# Patient Record
Sex: FEMALE | Race: WHITE | NOT HISPANIC OR LATINO | Employment: FULL TIME | URBAN - METROPOLITAN AREA
[De-identification: names, ages, dates, MRNs, and addresses within clinical notes are randomized per-mention and may not be internally consistent; named-entity substitution may affect disease eponyms.]

---

## 2022-11-28 ENCOUNTER — PATIENT MESSAGE (OUTPATIENT)
Dept: CARDIOLOGY | Facility: CLINIC | Age: 47
End: 2022-11-28

## 2023-01-19 ENCOUNTER — OFFICE VISIT (OUTPATIENT)
Dept: URGENT CARE | Facility: CLINIC | Age: 48
End: 2023-01-19
Payer: COMMERCIAL

## 2023-01-19 VITALS
DIASTOLIC BLOOD PRESSURE: 81 MMHG | HEIGHT: 63 IN | TEMPERATURE: 99 F | RESPIRATION RATE: 20 BRPM | OXYGEN SATURATION: 97 % | WEIGHT: 178 LBS | HEART RATE: 74 BPM | SYSTOLIC BLOOD PRESSURE: 115 MMHG | BODY MASS INDEX: 31.54 KG/M2

## 2023-01-19 DIAGNOSIS — H66.91 RIGHT OTITIS MEDIA, UNSPECIFIED OTITIS MEDIA TYPE: Primary | ICD-10-CM

## 2023-01-19 PROBLEM — T81.328A DEHISCENCE OF VAGINAL CUFF: Status: ACTIVE | Noted: 2023-01-19

## 2023-01-19 PROBLEM — I10 ESSENTIAL HYPERTENSION: Status: ACTIVE | Noted: 2020-10-14

## 2023-01-19 PROBLEM — E07.9 THYROID DISEASE: Status: ACTIVE | Noted: 2023-01-19

## 2023-01-19 PROBLEM — E03.9 ACQUIRED HYPOTHYROIDISM: Status: ACTIVE | Noted: 2021-08-07

## 2023-01-19 PROBLEM — G43.909 MIGRAINE SYNDROME: Status: ACTIVE | Noted: 2023-01-19

## 2023-01-19 PROBLEM — E66.811 CLASS 1 OBESITY DUE TO EXCESS CALORIES WITH SERIOUS COMORBIDITY AND BODY MASS INDEX (BMI) OF 31.0 TO 31.9 IN ADULT: Status: ACTIVE | Noted: 2020-03-19

## 2023-01-19 PROBLEM — K62.5 RECTAL BLEEDING: Status: ACTIVE | Noted: 2021-08-07

## 2023-01-19 PROBLEM — R74.8 ELEVATED LIVER ENZYMES: Status: ACTIVE | Noted: 2021-08-08

## 2023-01-19 PROBLEM — T81.31XA DEHISCENCE OF VAGINAL CUFF: Status: ACTIVE | Noted: 2023-01-19

## 2023-01-19 PROBLEM — T78.02XA ANAPHYLAXIS DUE TO SHELLFISH: Status: ACTIVE | Noted: 2020-10-14

## 2023-01-19 PROBLEM — F41.9 ANXIETY: Status: ACTIVE | Noted: 2023-01-19

## 2023-01-19 PROBLEM — E66.09 CLASS 1 OBESITY DUE TO EXCESS CALORIES WITH SERIOUS COMORBIDITY AND BODY MASS INDEX (BMI) OF 31.0 TO 31.9 IN ADULT: Status: ACTIVE | Noted: 2020-03-19

## 2023-01-19 LAB
CTP QC/QA: YES
CTP QC/QA: YES
MOLECULAR STREP A: NEGATIVE
SARS-COV-2 AG RESP QL IA.RAPID: NEGATIVE

## 2023-01-19 PROCEDURE — 3074F PR MOST RECENT SYSTOLIC BLOOD PRESSURE < 130 MM HG: ICD-10-PCS | Mod: CPTII,S$GLB,, | Performed by: PHYSICIAN ASSISTANT

## 2023-01-19 PROCEDURE — 3079F DIAST BP 80-89 MM HG: CPT | Mod: CPTII,S$GLB,, | Performed by: PHYSICIAN ASSISTANT

## 2023-01-19 PROCEDURE — 3008F BODY MASS INDEX DOCD: CPT | Mod: CPTII,S$GLB,, | Performed by: PHYSICIAN ASSISTANT

## 2023-01-19 PROCEDURE — 87651 POCT STREP A MOLECULAR: ICD-10-PCS | Mod: QW,S$GLB,, | Performed by: PHYSICIAN ASSISTANT

## 2023-01-19 PROCEDURE — 1159F MED LIST DOCD IN RCRD: CPT | Mod: CPTII,S$GLB,, | Performed by: PHYSICIAN ASSISTANT

## 2023-01-19 PROCEDURE — 87811 SARS CORONAVIRUS 2 ANTIGEN POCT, MANUAL READ: ICD-10-PCS | Mod: QW,S$GLB,, | Performed by: PHYSICIAN ASSISTANT

## 2023-01-19 PROCEDURE — 87811 SARS-COV-2 COVID19 W/OPTIC: CPT | Mod: QW,S$GLB,, | Performed by: PHYSICIAN ASSISTANT

## 2023-01-19 PROCEDURE — 87651 STREP A DNA AMP PROBE: CPT | Mod: QW,S$GLB,, | Performed by: PHYSICIAN ASSISTANT

## 2023-01-19 PROCEDURE — 3074F SYST BP LT 130 MM HG: CPT | Mod: CPTII,S$GLB,, | Performed by: PHYSICIAN ASSISTANT

## 2023-01-19 PROCEDURE — 1159F PR MEDICATION LIST DOCUMENTED IN MEDICAL RECORD: ICD-10-PCS | Mod: CPTII,S$GLB,, | Performed by: PHYSICIAN ASSISTANT

## 2023-01-19 PROCEDURE — 1160F RVW MEDS BY RX/DR IN RCRD: CPT | Mod: CPTII,S$GLB,, | Performed by: PHYSICIAN ASSISTANT

## 2023-01-19 PROCEDURE — 99203 PR OFFICE/OUTPT VISIT, NEW, LEVL III, 30-44 MIN: ICD-10-PCS | Mod: S$GLB,,, | Performed by: PHYSICIAN ASSISTANT

## 2023-01-19 PROCEDURE — 1160F PR REVIEW ALL MEDS BY PRESCRIBER/CLIN PHARMACIST DOCUMENTED: ICD-10-PCS | Mod: CPTII,S$GLB,, | Performed by: PHYSICIAN ASSISTANT

## 2023-01-19 PROCEDURE — 99203 OFFICE O/P NEW LOW 30 MIN: CPT | Mod: S$GLB,,, | Performed by: PHYSICIAN ASSISTANT

## 2023-01-19 PROCEDURE — 3008F PR BODY MASS INDEX (BMI) DOCUMENTED: ICD-10-PCS | Mod: CPTII,S$GLB,, | Performed by: PHYSICIAN ASSISTANT

## 2023-01-19 PROCEDURE — 3079F PR MOST RECENT DIASTOLIC BLOOD PRESSURE 80-89 MM HG: ICD-10-PCS | Mod: CPTII,S$GLB,, | Performed by: PHYSICIAN ASSISTANT

## 2023-01-19 RX ORDER — CEFDINIR 300 MG/1
300 CAPSULE ORAL 2 TIMES DAILY
Qty: 20 CAPSULE | Refills: 0 | Status: SHIPPED | OUTPATIENT
Start: 2023-01-19 | End: 2023-01-29

## 2023-01-19 RX ORDER — TRAMADOL HYDROCHLORIDE 50 MG/1
50 TABLET ORAL EVERY 6 HOURS PRN
COMMUNITY
Start: 2022-11-23

## 2023-01-19 RX ORDER — DICYCLOMINE HYDROCHLORIDE 20 MG/1
TABLET ORAL
COMMUNITY
Start: 2022-12-10

## 2023-01-19 RX ORDER — CYCLOBENZAPRINE HCL 10 MG
10 TABLET ORAL
COMMUNITY
Start: 2022-11-28

## 2023-01-19 RX ORDER — DEXTROAMPHETAMINE SACCHARATE, AMPHETAMINE ASPARTATE MONOHYDRATE, DEXTROAMPHETAMINE SULFATE AND AMPHETAMINE SULFATE 3.75; 3.75; 3.75; 3.75 MG/1; MG/1; MG/1; MG/1
CAPSULE, EXTENDED RELEASE ORAL
COMMUNITY
Start: 2022-10-27

## 2023-01-19 RX ORDER — CYANOCOBALAMIN 1000 UG/ML
INJECTION, SOLUTION INTRAMUSCULAR; SUBCUTANEOUS
COMMUNITY
Start: 2022-10-28

## 2023-01-19 RX ORDER — HYDROXYZINE HYDROCHLORIDE 25 MG/1
25 TABLET, FILM COATED ORAL
COMMUNITY
Start: 2022-12-28

## 2023-01-19 RX ORDER — PANTOPRAZOLE SODIUM 20 MG/1
20 TABLET, DELAYED RELEASE ORAL EVERY MORNING
COMMUNITY
Start: 2022-12-10

## 2023-01-19 NOTE — PATIENT INSTRUCTIONS

## 2023-01-19 NOTE — PROGRESS NOTES
"Subjective:       Patient ID: Tiffany Kamara is a 47 y.o. female.    Vitals:  height is 5' 3" (1.6 m) and weight is 80.7 kg (178 lb). Her oral temperature is 99.2 °F (37.3 °C). Her blood pressure is 115/81 and her pulse is 74. Her respiration is 20 and oxygen saturation is 97%.     Chief Complaint: Sore Throat    Patient is a 47 year old female who presents with a 3 day history of throat pain and ear pain. Patient states she has some associated swollen/painful lymph nodes. She states she has taken tylenol with some relief. No cough, congestion, fever, chills, CP, SOB, nausea, vomiting or diarrhea. No known sick contacts.     Sore Throat   This is a new problem. The current episode started in the past 7 days (Tuesday). The problem has been gradually worsening. The pain is worse on the right side. There has been no fever. The pain is at a severity of 7/10. The pain is moderate. Associated symptoms include congestion (ear), ear pain (right) and swollen glands. Pertinent negatives include no abdominal pain, coughing, diarrhea, drooling, ear discharge, headaches, hoarse voice, plugged ear sensation, neck pain, shortness of breath, stridor, trouble swallowing or vomiting. She has had no exposure to strep or mono. She has tried acetaminophen (Tylenol) for the symptoms. The treatment provided no relief.     Constitution: Negative for chills and fever.   HENT:  Positive for ear pain (right), congestion (ear) and sore throat. Negative for ear discharge, drooling, postnasal drip, sinus pain, sinus pressure and trouble swallowing.    Neck: Negative for neck pain and painful lymph nodes.   Cardiovascular:  Negative for chest pain.   Respiratory:  Negative for cough, sputum production, shortness of breath, stridor and wheezing.    Gastrointestinal:  Negative for abdominal pain, nausea, vomiting and diarrhea.   Musculoskeletal:  Negative for muscle ache.   Neurological:  Negative for headaches.   Hematologic/Lymphatic: Negative for " swollen lymph nodes.     Objective:      Physical Exam   Constitutional: She is oriented to person, place, and time. She appears well-developed. She is cooperative.  Non-toxic appearance. She does not appear ill. No distress.   HENT:   Head: Normocephalic and atraumatic.   Ears:   Right Ear: Hearing, external ear and ear canal normal. No mastoid tenderness. Tympanic membrane is injected, erythematous and bulging. No middle ear effusion.   Left Ear: Hearing, tympanic membrane, external ear and ear canal normal. No mastoid tenderness. Tympanic membrane is not injected, not erythematous and not bulging.  No middle ear effusion.   Nose: Mucosal edema present. No rhinorrhea or nasal deformity. No epistaxis. Right sinus exhibits no maxillary sinus tenderness and no frontal sinus tenderness. Left sinus exhibits no maxillary sinus tenderness and no frontal sinus tenderness.   Mouth/Throat: Uvula is midline and mucous membranes are normal. No trismus in the jaw. Normal dentition. No uvula swelling. Posterior oropharyngeal erythema present. No oropharyngeal exudate or posterior oropharyngeal edema.   Eyes: Conjunctivae and lids are normal. No scleral icterus.   Neck: Trachea normal and phonation normal. Neck supple. No edema present. No erythema present. No neck rigidity present.   Cardiovascular: Normal rate, regular rhythm, normal heart sounds and normal pulses.   Pulmonary/Chest: Effort normal and breath sounds normal. No respiratory distress. She has no decreased breath sounds. She has no rhonchi.   Abdominal: Normal appearance.   Musculoskeletal: Normal range of motion.         General: No deformity. Normal range of motion.   Lymphadenopathy:     She has cervical adenopathy.        Right cervical: Superficial cervical adenopathy present.        Left cervical: No superficial cervical adenopathy present.   Neurological: She is alert and oriented to person, place, and time. She exhibits normal muscle tone. Coordination  normal.   Skin: Skin is warm, dry, intact, not diaphoretic and not pale.   Psychiatric: Her speech is normal and behavior is normal. Judgment and thought content normal.   Nursing note and vitals reviewed.      Results for orders placed or performed in visit on 01/19/23   POCT Strep A, Molecular   Result Value Ref Range    Molecular Strep A, POC Negative Negative     Acceptable Yes    SARS Coronavirus 2 Antigen, POCT Manual Read   Result Value Ref Range    SARS Coronavirus 2 Antigen Negative Negative     Acceptable Yes        Assessment:       1. Right otitis media, unspecified otitis media type          Plan:         Right otitis media, unspecified otitis media type  -     POCT Strep A, Molecular  -     SARS Coronavirus 2 Antigen, POCT Manual Read  -     cefdinir (OMNICEF) 300 MG capsule; Take 1 capsule (300 mg total) by mouth 2 (two) times daily. for 10 days  Dispense: 20 capsule; Refill: 0    Discussed results with patient.  Discussed use of OTC medications for symptom control as well as oral abx for ear infection.   All ER precautions covered including but not limited to shortness of breath, intractable fever, severe ear pain, ear drainage or chest pain.  Discussed RTC if symptoms worsen, change, or persist.     Patient verbalized understanding and agreed with the plan.     Halle Vidales PA-C    Patient Instructions   General Discharge Instructions   If you were prescribed a narcotic or controlled medication, do not drive or operate heavy equipment or machinery while taking these medications.  If you were prescribed antibiotics, please take them to completion.  You must understand that you've received an Urgent Care treatment only and that you may be released before all your medical problems are known or treated. You, the patient, will arrange for follow up care as instructed.  Follow up with your PCP or specialty clinic as directed in the next 1-2 weeks if not improved or as  needed.  You can call [phone] to schedule an appointment with the appropriate provider.  If your condition worsens we recommend that you receive another evaluation at the emergency room immediately or contact your primary medical clinics after hours call service to discuss your concerns.  Please return here or go to the Emergency Department for any concerns or worsening of condition.

## 2023-01-22 ENCOUNTER — TELEPHONE (OUTPATIENT)
Dept: URGENT CARE | Facility: CLINIC | Age: 48
End: 2023-01-22
Payer: COMMERCIAL

## 2023-01-22 NOTE — TELEPHONE ENCOUNTER
Patient went to a different provider and 2 hours late she tasted positive for strep and was prescribed abx. Patient stated she is feeling better.

## 2023-04-24 PROBLEM — K62.5 RECTAL BLEEDING: Status: RESOLVED | Noted: 2021-08-07 | Resolved: 2023-04-24

## 2025-07-10 ENCOUNTER — HOSPITAL ENCOUNTER (EMERGENCY)
Facility: HOSPITAL | Age: 50
Discharge: HOME | End: 2025-07-10
Attending: EMERGENCY MEDICINE
Payer: COMMERCIAL

## 2025-07-10 VITALS
OXYGEN SATURATION: 97 % | TEMPERATURE: 98 F | RESPIRATION RATE: 18 BRPM | DIASTOLIC BLOOD PRESSURE: 90 MMHG | HEIGHT: 63 IN | BODY MASS INDEX: 28.53 KG/M2 | HEART RATE: 75 BPM | SYSTOLIC BLOOD PRESSURE: 143 MMHG | WEIGHT: 161 LBS

## 2025-07-10 DIAGNOSIS — R31.0 GROSS HEMATURIA: Primary | ICD-10-CM

## 2025-07-10 LAB
ABSOLUTE EOSINOPHIL (OHS): 0.21 K/UL
ABSOLUTE MONOCYTE (OHS): 0.75 K/UL (ref 0.3–1)
ABSOLUTE NEUTROPHIL COUNT (OHS): 9.45 K/UL (ref 1.8–7.7)
ALBUMIN SERPL BCP-MCNC: 4.1 G/DL (ref 3.5–5.2)
ALP SERPL-CCNC: 138 UNIT/L (ref 40–150)
ALT SERPL W/O P-5'-P-CCNC: 52 UNIT/L (ref 10–44)
ANION GAP (OHS): 13 MMOL/L (ref 8–16)
AST SERPL-CCNC: 71 UNIT/L (ref 11–45)
BACTERIA #/AREA URNS HPF: ABNORMAL /HPF
BASOPHILS # BLD AUTO: 0.06 K/UL
BASOPHILS NFR BLD AUTO: 0.4 %
BILIRUB SERPL-MCNC: 0.3 MG/DL (ref 0.1–1)
BILIRUB UR QL STRIP.AUTO: ABNORMAL
BUN SERPL-MCNC: 6 MG/DL (ref 6–20)
CALCIUM SERPL-MCNC: 8.9 MG/DL (ref 8.7–10.5)
CHLORIDE SERPL-SCNC: 101 MMOL/L (ref 95–110)
CLARITY UR: CLEAR
CO2 SERPL-SCNC: 22 MMOL/L (ref 23–29)
COLOR UR AUTO: ABNORMAL
CREAT SERPL-MCNC: 0.8 MG/DL (ref 0.5–1.4)
ERYTHROCYTE [DISTWIDTH] IN BLOOD BY AUTOMATED COUNT: 11.6 % (ref 11.5–14.5)
GFR SERPLBLD CREATININE-BSD FMLA CKD-EPI: >60 ML/MIN/1.73/M2
GLUCOSE SERPL-MCNC: 99 MG/DL (ref 70–110)
GLUCOSE UR QL STRIP: ABNORMAL
HCT VFR BLD AUTO: 43 % (ref 37–48.5)
HGB BLD-MCNC: 15.5 GM/DL (ref 12–16)
HGB UR QL STRIP: ABNORMAL
HOLD SPECIMEN: NORMAL
HYALINE CASTS #/AREA URNS LPF: 2 /LPF (ref 0–1)
IMM GRANULOCYTES # BLD AUTO: 0.05 K/UL (ref 0–0.04)
IMM GRANULOCYTES NFR BLD AUTO: 0.4 % (ref 0–0.5)
KETONES UR QL STRIP: ABNORMAL
LEUKOCYTE ESTERASE UR QL STRIP: ABNORMAL
LYMPHOCYTES # BLD AUTO: 2.96 K/UL (ref 1–4.8)
MCH RBC QN AUTO: 34.3 PG (ref 27–31)
MCHC RBC AUTO-ENTMCNC: 36 G/DL (ref 32–36)
MCV RBC AUTO: 95 FL (ref 82–98)
MICROSCOPIC COMMENT: ABNORMAL
NITRITE UR QL STRIP: ABNORMAL
NUCLEATED RBC (/100WBC) (OHS): 0 /100 WBC
PH UR STRIP: ABNORMAL [PH]
PLATELET # BLD AUTO: 335 K/UL (ref 150–450)
PMV BLD AUTO: 8.8 FL (ref 9.2–12.9)
POTASSIUM SERPL-SCNC: 3.4 MMOL/L (ref 3.5–5.1)
PROT SERPL-MCNC: 7.3 GM/DL (ref 6–8.4)
PROT UR QL STRIP: ABNORMAL
RBC # BLD AUTO: 4.52 M/UL (ref 4–5.4)
RBC #/AREA URNS HPF: 25 /HPF (ref 0–4)
RELATIVE EOSINOPHIL (OHS): 1.6 %
RELATIVE LYMPHOCYTE (OHS): 22 % (ref 18–48)
RELATIVE MONOCYTE (OHS): 5.6 % (ref 4–15)
RELATIVE NEUTROPHIL (OHS): 70 % (ref 38–73)
SODIUM SERPL-SCNC: 136 MMOL/L (ref 136–145)
SP GR UR STRIP: ABNORMAL
SQUAMOUS #/AREA URNS HPF: 3 /HPF
UROBILINOGEN UR STRIP-ACNC: ABNORMAL EU/DL
WBC # BLD AUTO: 13.48 K/UL (ref 3.9–12.7)
WBC #/AREA URNS HPF: 40 /HPF (ref 0–5)

## 2025-07-10 PROCEDURE — 96375 TX/PRO/DX INJ NEW DRUG ADDON: CPT

## 2025-07-10 PROCEDURE — 74176 CT ABD & PELVIS W/O CONTRAST: CPT | Mod: TC,FY

## 2025-07-10 PROCEDURE — 96374 THER/PROPH/DIAG INJ IV PUSH: CPT

## 2025-07-10 PROCEDURE — 25000003 PHARM REV CODE 250: Performed by: EMERGENCY MEDICINE

## 2025-07-10 PROCEDURE — 87086 URINE CULTURE/COLONY COUNT: CPT | Performed by: EMERGENCY MEDICINE

## 2025-07-10 PROCEDURE — 80053 COMPREHEN METABOLIC PANEL: CPT | Performed by: EMERGENCY MEDICINE

## 2025-07-10 PROCEDURE — 85025 COMPLETE CBC W/AUTO DIFF WBC: CPT | Performed by: EMERGENCY MEDICINE

## 2025-07-10 PROCEDURE — 99285 EMERGENCY DEPT VISIT HI MDM: CPT | Mod: 25

## 2025-07-10 PROCEDURE — 63600175 PHARM REV CODE 636 W HCPCS: Performed by: EMERGENCY MEDICINE

## 2025-07-10 PROCEDURE — 81003 URINALYSIS AUTO W/O SCOPE: CPT | Performed by: EMERGENCY MEDICINE

## 2025-07-10 RX ORDER — HYDROCODONE BITARTRATE AND ACETAMINOPHEN 10; 325 MG/1; MG/1
1 TABLET ORAL
Refills: 0 | Status: COMPLETED | OUTPATIENT
Start: 2025-07-10 | End: 2025-07-10

## 2025-07-10 RX ORDER — KETOROLAC TROMETHAMINE 30 MG/ML
30 INJECTION, SOLUTION INTRAMUSCULAR; INTRAVENOUS
Status: COMPLETED | OUTPATIENT
Start: 2025-07-10 | End: 2025-07-10

## 2025-07-10 RX ORDER — ONDANSETRON HYDROCHLORIDE 2 MG/ML
4 INJECTION, SOLUTION INTRAVENOUS
Status: COMPLETED | OUTPATIENT
Start: 2025-07-10 | End: 2025-07-10

## 2025-07-10 RX ORDER — ACETAMINOPHEN 325 MG/1
650 TABLET ORAL
Status: COMPLETED | OUTPATIENT
Start: 2025-07-10 | End: 2025-07-10

## 2025-07-10 RX ORDER — ONDANSETRON 4 MG/1
4 TABLET, ORALLY DISINTEGRATING ORAL EVERY 6 HOURS PRN
Qty: 12 TABLET | Refills: 0 | Status: SHIPPED | OUTPATIENT
Start: 2025-07-10

## 2025-07-10 RX ORDER — CEPHALEXIN 250 MG/1
250 CAPSULE ORAL EVERY 8 HOURS
Qty: 21 CAPSULE | Refills: 0 | Status: ON HOLD | OUTPATIENT
Start: 2025-07-10 | End: 2025-07-19 | Stop reason: HOSPADM

## 2025-07-10 RX ORDER — HYDROCODONE BITARTRATE AND ACETAMINOPHEN 10; 325 MG/1; MG/1
1 TABLET ORAL EVERY 6 HOURS PRN
Qty: 12 TABLET | Refills: 0 | Status: ON HOLD | OUTPATIENT
Start: 2025-07-10 | End: 2025-07-19 | Stop reason: HOSPADM

## 2025-07-10 RX ADMIN — KETOROLAC TROMETHAMINE 30 MG: 30 INJECTION, SOLUTION INTRAMUSCULAR; INTRAVENOUS at 02:07

## 2025-07-10 RX ADMIN — ONDANSETRON 4 MG: 2 INJECTION INTRAMUSCULAR; INTRAVENOUS at 02:07

## 2025-07-10 RX ADMIN — ACETAMINOPHEN 650 MG: 325 TABLET ORAL at 02:07

## 2025-07-10 RX ADMIN — HYDROCODONE BITARTRATE AND ACETAMINOPHEN 1 TABLET: 10; 325 TABLET ORAL at 03:07

## 2025-07-10 NOTE — ED PROVIDER NOTES
Encounter Date: 7/10/2025       History     Chief Complaint   Patient presents with    Dysuria     Pt reports pain and burning with urination. Pt reports urinary frequency. Pt reports symptoms began a few hours ago.     Hematuria     Pt reports blood clots in urine.     Abdominal Pain     Pt reports lower abd/pelvic pain in the area of the bladder. Pt reports all symptoms began a few hours ago. Pt reports she knew it was a UTI and was trying to drink lots of water but nothing was helping the symptoms. Pt reports the pain is making her feel dizzy and making her ears ring.      49-year-old female here from home via private vehicle for evaluation and treatment of left lower quadrant abdominal pain.  Pain started a few days ago in left mid back.  She does endorse hematuria, and admits to history of kidney stones.  She has not tried any medications at home for her symptoms.  Nothing she does makes her symptoms any better or worse.  Also complains of mild nausea.  No vomiting.  Has had occasional burning with urination.  Symptoms became significantly worse few hours ago.      Review of patient's allergies indicates:   Allergen Reactions    Codeine Hives     Other reaction(s): CHEST PAINS    Hydromorphone      Other reaction(s): CHEST PAINS    Penicillins Palpitations and Rash     Other reaction(s): SWELLING OF FACE AND ARMS  Rash as child, has tollerated keflex as adult      Shellfish containing products Anaphylaxis    Tigan [trimethobenzamide] Swelling    Sumatriptan succinate Other (See Comments)     Other reaction(s): Other (See Comments)  Patient can't remember what happens when she takes it  parasthias       Morphine     Stadol [butorphanol]     Hydrocodone Palpitations     Past Medical History:   Diagnosis Date    Hypertension     Hypothyroidism     Urinary tract infection, site not specified      Past Surgical History:   Procedure Laterality Date    APPENDECTOMY      CHOLECYSTECTOMY      DILATION AND CURETTAGE OF  UTERUS      HYSTERECTOMY  12/17/2017    [facility]/[place]    KNEE ARTHRODESIS      LAPAROSCOPY      Pubovaginal Sling with Lynx and Cystoscopy      REVISION OF VAGINAL CUFF      TONSILLECTOMY       Family History   Problem Relation Name Age of Onset    Breast cancer Mother       Social History[1]  Review of Systems   Constitutional:  Negative for chills and fatigue.   HENT:  Negative for congestion, rhinorrhea and sore throat.    Eyes:  Negative for photophobia and pain.   Respiratory:  Negative for cough, chest tightness and shortness of breath.    Cardiovascular:  Negative for chest pain and palpitations.   Gastrointestinal:  Positive for abdominal pain and nausea. Negative for blood in stool, constipation, diarrhea and vomiting.   Genitourinary:  Positive for dysuria, flank pain, frequency and hematuria. Negative for decreased urine volume.   Musculoskeletal:  Positive for back pain (Left, middle). Negative for myalgias and neck pain.   Neurological:  Negative for dizziness, syncope, weakness, light-headedness, numbness and headaches.   Psychiatric/Behavioral:  The patient is not nervous/anxious.        Physical Exam     Initial Vitals [07/10/25 0202]   BP Pulse Resp Temp SpO2   (!) 149/98 92 17 97.8 °F (36.6 °C) 99 %      MAP       --         Physical Exam    Nursing note and vitals reviewed.  Constitutional: She appears well-developed and well-nourished. She is not diaphoretic. She appears distressed (Secondary to pain).   HENT:   Head: Normocephalic and atraumatic.   Nose: Nose normal. Mouth/Throat: Oropharynx is clear and moist. No oropharyngeal exudate.   Eyes: Conjunctivae and EOM are normal. Pupils are equal, round, and reactive to light. No scleral icterus.   Neck: Neck supple. No JVD present.   Normal range of motion.  Cardiovascular:  Normal rate, regular rhythm, normal heart sounds and intact distal pulses.           No murmur heard.  Pulmonary/Chest: Breath sounds normal. No stridor. No respiratory distress.  She has no wheezes. She has no rhonchi. She has no rales.   Abdominal: Abdomen is soft. Bowel sounds are normal. She exhibits no distension. There is abdominal tenderness (Left lower quadrant). There is no rebound and no guarding.   Musculoskeletal:         General: No tenderness or edema. Normal range of motion.      Cervical back: Normal range of motion and neck supple.     Neurological: She is alert and oriented to person, place, and time. She has normal strength. No cranial nerve deficit or sensory deficit. GCS score is 15. GCS eye subscore is 4. GCS verbal subscore is 5. GCS motor subscore is 6.   Skin: Skin is warm and dry. Capillary refill takes less than 2 seconds. No rash noted. No erythema.   Psychiatric: She has a normal mood and affect. Her behavior is normal.         ED Course   Procedures  Labs Reviewed   URINALYSIS, REFLEX TO URINE CULTURE - Abnormal       Result Value    Color, UA Orange (*)     Appearance, UA Clear      pH, UA Color may interfere with results      Spec Grav UA Color may interfere with results      Protein, UA Color may interfere with results      Glucose, UA Color may interfere with results      Ketones, UA Color may interfere with results      Bilirubin, UA Color may interfere with results      Blood, UA Color may interfere with results      Nitrites, UA Color may interfere with results      Urobilinogen, UA Color may interfere with results      Leukocyte Esterase, UA Color may interfere with results     URINALYSIS MICROSCOPIC - Abnormal    RBC, UA 25 (*)     WBC, UA 40 (*)     Bacteria, UA Occasional      Squamous Epithelial Cells, UA 3      Hyaline Casts, UA 2 (*)     Microscopic Comment       COMPREHENSIVE METABOLIC PANEL - Abnormal    Sodium 136      Potassium 3.4 (*)     Chloride 101      CO2 22 (*)     Glucose 99      BUN 6      Creatinine 0.8      Calcium 8.9      Protein Total 7.3      Albumin 4.1      Bilirubin Total 0.3            AST 71 (*)     ALT 52 (*)      Anion Gap 13      eGFR >60     CBC WITH DIFFERENTIAL - Abnormal    WBC 13.48 (*)     RBC 4.52      HGB 15.5      HCT 43.0      MCV 95      MCH 34.3 (*)     MCHC 36.0      RDW 11.6      Platelet Count 335      MPV 8.8 (*)     Nucleated RBC 0      Neut % 70.0      Lymph % 22.0      Mono % 5.6      Eos % 1.6      Basophil % 0.4      Imm Grans % 0.4      Neut # 9.45 (*)     Lymph # 2.96      Mono # 0.75      Eos # 0.21      Baso # 0.06      Imm Grans # 0.05 (*)    CULTURE, URINE   CBC W/ AUTO DIFFERENTIAL    Narrative:     The following orders were created for panel order CBC auto differential.  Procedure                               Abnormality         Status                     ---------                               -----------         ------                     CBC with Differential[[id number]]       Abnormal            Final result                 Please view results for these tests on the individual orders.   EXTRA TUBES    Narrative:     The following orders were created for panel order EXTRA TUBES.  Procedure                               Abnormality         Status                     ---------                               -----------         ------                     Gold Top Hold[[id number]]                                   Final result                 Please view results for these tests on the individual orders.   GOLD TOP HOLD    Extra Tube Hold for add-ons.     GREY TOP URINE HOLD          Imaging Results              CT Renal Stone Study ABD Pelvis WO (Final result)  Result time 07/10/25 05:02:57      Final result by Kirby Glaser MD (07/10/25 05:02:57)                   Impression:      No calculi or hydroureteronephrosis.    Contracted bladder with thickened wall.  Correlate with urinalysis if clinical concern for cystitis.    Prior cholecystectomy and hysterectomy.      Electronically signed by: Kirby Glaser MD  Date:    07/10/2025  Time:    05:02               Narrative:     EXAMINATION:  CT RENAL STONE STUDY ABD PELVIS WO    CLINICAL HISTORY:  Flank pain, kidney stone suspected;    TECHNIQUE:  Low dose axial images, sagittal and coronal reformations were obtained from the lung bases to the pubic symphysis.  Contrast was not administered.    COMPARISON:  None    FINDINGS:  Heart: Normal in size. No pericardial effusion.    Lung Bases: Well aerated, without consolidation or pleural fluid.    Liver: Normal in size and attenuation, with no focal hepatic lesions.    Gallbladder: Surgically absent.    Bile Ducts: No evidence of dilated ducts.    Pancreas: No mass or peripancreatic fat stranding.    Spleen: Unremarkable.    Adrenals: Unremarkable.    Kidneys/ Ureters: No calculi or hydronephrosis.    Bladder: Contracted with thickened wall.    Reproductive organs: Uterus is absent.    GI Tract/Mesentery: No evidence of bowel obstruction or inflammation.    Peritoneal Space: No ascites. No free air.    Retroperitoneum: No significant adenopathy.    Abdominal wall: Unremarkable.    Vasculature: No significant atherosclerosis or aneurysm.    Bones: Limbus vertebra superior anterior margin of L4.                                    X-Rays:   Independently Interpreted Readings:   Other Readings:  CT abdomen pelvis personally reviewed by me showed no evidence of hydronephrosis or hydroureter, no ureteral calculi were noted.  Bladder walls thickened, consistent with cystitis    Medications   ketorolac injection 30 mg (30 mg Intravenous Given 7/10/25 0255)   ondansetron injection 4 mg (4 mg Intravenous Given 7/10/25 0255)   acetaminophen tablet 650 mg (650 mg Oral Given 7/10/25 0255)   HYDROcodone-acetaminophen  mg per tablet 1 tablet (1 tablet Oral Given 7/10/25 0347)     Medical Decision Making  Differential includes pyelonephritis, ureteral calculus, cystitis,, appendicitis, neoplasm, etc.    Labs showed a mildly elevated white blood cell count.  Urinalysis did not show a significant amount  of bacteria however.  CT was unremarkable, with a normal-appearing kidney and ureter, and no evidence of ureteral calculus.  Bladder walls were thickened, indicating possible cystitis.  Patient is feeling much better after being given hydrocodone here.  She also received Toradol.  I believe she is safe for discharge home.  Will start Keflex as a precaution case there is an infection, and she will be given hydrocodone and Zofran.  She will follow-up with her urologist within the next few days and return here as needed or if worse in any way.    Amount and/or Complexity of Data Reviewed  Labs: ordered.  Radiology: ordered.    Risk  OTC drugs.  Prescription drug management.                                      Clinical Impression:  Final diagnoses:  [R31.0] Gross hematuria (Primary)          ED Disposition Condition    Discharge Stable          ED Prescriptions       Medication Sig Dispense Start Date End Date Auth. Provider    HYDROcodone-acetaminophen (NORCO)  mg per tablet Take 1 tablet by mouth every 6 (six) hours as needed for Pain. 12 tablet 7/10/2025 -- Fady Thomas MD    ondansetron (ZOFRAN-ODT) 4 MG TbDL Take 1 tablet (4 mg total) by mouth every 6 (six) hours as needed. 12 tablet 7/10/2025 -- Fady Thomas MD    cephALEXin (KEFLEX) 250 MG capsule Take 1 capsule (250 mg total) by mouth every 8 (eight) hours. for 7 days 21 capsule 7/10/2025 7/17/2025 Fady Thomas MD          Follow-up Information       Follow up With Specialties Details Why Contact Info    Your urologist  Call today      [facility] Emergency Dept Emergency Medicine  As needed, If symptoms worsen [address] [place] [address] [phone]                 Fady Thomas MD  07/10/25 0514       Fady Thomas MD  07/10/25 0517         [1]   Social History  Tobacco Use    Smoking status: Never     Passive exposure: Never    Smokeless tobacco: Never   Vaping Use    Vaping status: Never Used   Substance  Use Topics    Alcohol use: Yes    Drug use: Never        Fady Thomas MD  07/10/25 0544

## 2025-07-10 NOTE — DISCHARGE INSTRUCTIONS
As we discussed, your lab work was unremarkable for the most part.  No elevation of the white blood cell count, and only if few scattered bacteria in the urine sample.  CT shows no evidence of swelling of the kidney or the ureter, but the bladder wall was thickened.  This may be secondary to a passed stone, or an infection that has not picked up on the urine test.  Take Keflex for possible infection, take Zofran for nausea, and take hydrocodone for pain not relieved by over-the-counter medications.  Contact your urologist to schedule an appointment in the near future, and return here for any worsening signs or symptoms.

## 2025-07-10 NOTE — ED NOTES
Pt in restroom at this time for urine specimen collection.    Detail Level: Detailed Post-Care Instructions: I reviewed with the patient in detail post-care instructions. Patient is to wear sunprotection, and avoid picking at any of the treated lesions. Pt may apply Vaseline to crusted or scabbing areas. Show Applicator Variable?: Yes Number Of Freeze-Thaw Cycles: 2 freeze-thaw cycles Render Note In Bullet Format When Appropriate: No Application Tool (Optional): Liquid Nitrogen Sprayer Consent: The patient's consent was obtained including but not limited to risks of crusting, scabbing, blistering, scarring, darker or lighter pigmentary change, recurrence, incomplete removal and infection.

## 2025-07-10 NOTE — ED NOTES
Pt presents to ED via POV for dysuria, hematuria, and lower abdominal pain.  Pt reports sudden onset lower abdominal pain that began a few hours ago.  Pt reports dysuria with fantasma red blood in urine.  Pt urine specimen light red.  Pt reports that she thinks it is a UTI and has been drinking lots of water in attempt to flush it out with no symptom relief.  Pt denies any flank pain or back pain at this time.  No other concerns voiced at this time.  NAD noted.

## 2025-07-12 LAB — BACTERIA UR CULT: ABNORMAL

## 2025-07-16 ENCOUNTER — RESULTS FOLLOW-UP (OUTPATIENT)
Dept: EMERGENCY MEDICINE | Facility: HOSPITAL | Age: 50
End: 2025-07-16
Payer: COMMERCIAL

## 2025-07-16 ENCOUNTER — HOSPITAL ENCOUNTER (INPATIENT)
Facility: HOSPITAL | Age: 50
LOS: 2 days | Discharge: HOME | DRG: 690 | End: 2025-07-19
Attending: EMERGENCY MEDICINE | Admitting: INTERNAL MEDICINE
Payer: COMMERCIAL

## 2025-07-16 DIAGNOSIS — G43.909 MIGRAINE SYNDROME: ICD-10-CM

## 2025-07-16 DIAGNOSIS — Z01.818 PREOPERATIVE CLEARANCE: ICD-10-CM

## 2025-07-16 DIAGNOSIS — N10 ACUTE PYELONEPHRITIS: Primary | ICD-10-CM

## 2025-07-16 DIAGNOSIS — E87.1 HYPONATREMIA: ICD-10-CM

## 2025-07-16 DIAGNOSIS — N39.0 UTI DUE TO EXTENDED-SPECTRUM BETA LACTAMASE (ESBL) PRODUCING ESCHERICHIA COLI: ICD-10-CM

## 2025-07-16 DIAGNOSIS — Z16.12 UTI DUE TO EXTENDED-SPECTRUM BETA LACTAMASE (ESBL) PRODUCING ESCHERICHIA COLI: ICD-10-CM

## 2025-07-16 DIAGNOSIS — B96.29 UTI DUE TO EXTENDED-SPECTRUM BETA LACTAMASE (ESBL) PRODUCING ESCHERICHIA COLI: ICD-10-CM

## 2025-07-16 DIAGNOSIS — R07.9 CHEST PAIN: ICD-10-CM

## 2025-07-16 DIAGNOSIS — E87.6 HYPOKALEMIA: ICD-10-CM

## 2025-07-16 DIAGNOSIS — Z16.12 ESBL (EXTENDED SPECTRUM BETA-LACTAMASE) PRODUCING BACTERIA INFECTION: ICD-10-CM

## 2025-07-16 DIAGNOSIS — A49.9 ESBL (EXTENDED SPECTRUM BETA-LACTAMASE) PRODUCING BACTERIA INFECTION: ICD-10-CM

## 2025-07-16 LAB
ABSOLUTE EOSINOPHIL (OHS): 0.22 K/UL
ABSOLUTE MONOCYTE (OHS): 0.75 K/UL (ref 0.3–1)
ABSOLUTE NEUTROPHIL COUNT (OHS): 5.57 K/UL (ref 1.8–7.7)
ALBUMIN SERPL BCP-MCNC: 4 G/DL (ref 3.5–5.2)
ALP SERPL-CCNC: 164 UNIT/L (ref 40–150)
ALT SERPL W/O P-5'-P-CCNC: 61 UNIT/L (ref 10–44)
ANION GAP (OHS): 9 MMOL/L (ref 8–16)
AST SERPL-CCNC: 52 UNIT/L (ref 11–45)
BASOPHILS # BLD AUTO: 0.04 K/UL
BASOPHILS NFR BLD AUTO: 0.5 %
BILIRUB SERPL-MCNC: 0.6 MG/DL (ref 0.1–1)
BUN SERPL-MCNC: 9 MG/DL (ref 6–20)
CALCIUM SERPL-MCNC: 9 MG/DL (ref 8.7–10.5)
CHLORIDE SERPL-SCNC: 104 MMOL/L (ref 95–110)
CO2 SERPL-SCNC: 21 MMOL/L (ref 23–29)
CREAT SERPL-MCNC: 0.8 MG/DL (ref 0.5–1.4)
ERYTHROCYTE [DISTWIDTH] IN BLOOD BY AUTOMATED COUNT: 11.8 % (ref 11.5–14.5)
GFR SERPLBLD CREATININE-BSD FMLA CKD-EPI: >60 ML/MIN/1.73/M2
GLUCOSE SERPL-MCNC: 79 MG/DL (ref 70–110)
HCT VFR BLD AUTO: 43 % (ref 37–48.5)
HCV AB SERPL QL IA: NEGATIVE
HGB BLD-MCNC: 15 GM/DL (ref 12–16)
HIV 1+2 AB+HIV1 P24 AG SERPL QL IA: NEGATIVE
IMM GRANULOCYTES # BLD AUTO: 0.06 K/UL (ref 0–0.04)
IMM GRANULOCYTES NFR BLD AUTO: 0.7 % (ref 0–0.5)
LACTATE SERPL-SCNC: 1 MMOL/L (ref 0.5–2.2)
LYMPHOCYTES # BLD AUTO: 1.84 K/UL (ref 1–4.8)
MAGNESIUM SERPL-MCNC: 2.2 MG/DL (ref 1.6–2.6)
MCH RBC QN AUTO: 34.3 PG (ref 27–31)
MCHC RBC AUTO-ENTMCNC: 34.9 G/DL (ref 32–36)
MCV RBC AUTO: 98 FL (ref 82–98)
NUCLEATED RBC (/100WBC) (OHS): 0 /100 WBC
PLATELET # BLD AUTO: 289 K/UL (ref 150–450)
PMV BLD AUTO: 8.7 FL (ref 9.2–12.9)
POTASSIUM SERPL-SCNC: 3.4 MMOL/L (ref 3.5–5.1)
PROT SERPL-MCNC: 7.4 GM/DL (ref 6–8.4)
RBC # BLD AUTO: 4.37 M/UL (ref 4–5.4)
RELATIVE EOSINOPHIL (OHS): 2.6 %
RELATIVE LYMPHOCYTE (OHS): 21.7 % (ref 18–48)
RELATIVE MONOCYTE (OHS): 8.8 % (ref 4–15)
RELATIVE NEUTROPHIL (OHS): 65.7 % (ref 38–73)
SODIUM SERPL-SCNC: 134 MMOL/L (ref 136–145)
WBC # BLD AUTO: 8.48 K/UL (ref 3.9–12.7)

## 2025-07-16 PROCEDURE — 96376 TX/PRO/DX INJ SAME DRUG ADON: CPT

## 2025-07-16 PROCEDURE — 63600175 PHARM REV CODE 636 W HCPCS: Performed by: EMERGENCY MEDICINE

## 2025-07-16 PROCEDURE — G0378 HOSPITAL OBSERVATION PER HR: HCPCS

## 2025-07-16 PROCEDURE — 63600175 PHARM REV CODE 636 W HCPCS: Performed by: STUDENT IN AN ORGANIZED HEALTH CARE EDUCATION/TRAINING PROGRAM

## 2025-07-16 PROCEDURE — 99285 EMERGENCY DEPT VISIT HI MDM: CPT | Mod: 25

## 2025-07-16 PROCEDURE — 87389 HIV-1 AG W/HIV-1&-2 AB AG IA: CPT | Performed by: EMERGENCY MEDICINE

## 2025-07-16 PROCEDURE — 93010 ELECTROCARDIOGRAM REPORT: CPT | Mod: ,,, | Performed by: INTERNAL MEDICINE

## 2025-07-16 PROCEDURE — 85025 COMPLETE CBC W/AUTO DIFF WBC: CPT | Performed by: EMERGENCY MEDICINE

## 2025-07-16 PROCEDURE — 83605 ASSAY OF LACTIC ACID: CPT | Performed by: EMERGENCY MEDICINE

## 2025-07-16 PROCEDURE — 25500020 PHARM REV CODE 255: Performed by: EMERGENCY MEDICINE

## 2025-07-16 PROCEDURE — 93005 ELECTROCARDIOGRAM TRACING: CPT

## 2025-07-16 PROCEDURE — 25000003 PHARM REV CODE 250: Performed by: EMERGENCY MEDICINE

## 2025-07-16 PROCEDURE — 83735 ASSAY OF MAGNESIUM: CPT | Performed by: EMERGENCY MEDICINE

## 2025-07-16 PROCEDURE — 96361 HYDRATE IV INFUSION ADD-ON: CPT

## 2025-07-16 PROCEDURE — 96367 TX/PROPH/DG ADDL SEQ IV INF: CPT

## 2025-07-16 PROCEDURE — 87040 BLOOD CULTURE FOR BACTERIA: CPT | Mod: 91 | Performed by: EMERGENCY MEDICINE

## 2025-07-16 PROCEDURE — 63600175 PHARM REV CODE 636 W HCPCS: Mod: JZ,TB | Performed by: NURSE PRACTITIONER

## 2025-07-16 PROCEDURE — 86803 HEPATITIS C AB TEST: CPT | Performed by: EMERGENCY MEDICINE

## 2025-07-16 PROCEDURE — 96375 TX/PRO/DX INJ NEW DRUG ADDON: CPT

## 2025-07-16 PROCEDURE — 25000003 PHARM REV CODE 250: Performed by: NURSE PRACTITIONER

## 2025-07-16 PROCEDURE — 82435 ASSAY OF BLOOD CHLORIDE: CPT | Performed by: EMERGENCY MEDICINE

## 2025-07-16 PROCEDURE — 96365 THER/PROPH/DIAG IV INF INIT: CPT

## 2025-07-16 RX ORDER — NALOXONE HCL 0.4 MG/ML
0.02 VIAL (ML) INJECTION
Status: DISCONTINUED | OUTPATIENT
Start: 2025-07-16 | End: 2025-07-19 | Stop reason: HOSPADM

## 2025-07-16 RX ORDER — ACETAMINOPHEN 325 MG/1
650 TABLET ORAL EVERY 6 HOURS PRN
Status: DISCONTINUED | OUTPATIENT
Start: 2025-07-16 | End: 2025-07-19 | Stop reason: HOSPADM

## 2025-07-16 RX ORDER — ONDANSETRON HYDROCHLORIDE 2 MG/ML
4 INJECTION, SOLUTION INTRAVENOUS EVERY 6 HOURS PRN
Status: DISCONTINUED | OUTPATIENT
Start: 2025-07-16 | End: 2025-07-17

## 2025-07-16 RX ORDER — SODIUM CHLORIDE 0.9 % (FLUSH) 0.9 %
3 SYRINGE (ML) INJECTION EVERY 8 HOURS PRN
Status: DISCONTINUED | OUTPATIENT
Start: 2025-07-16 | End: 2025-07-19 | Stop reason: HOSPADM

## 2025-07-16 RX ORDER — DULOXETIN HYDROCHLORIDE 30 MG/1
90 CAPSULE, DELAYED RELEASE ORAL EVERY MORNING
Status: DISCONTINUED | OUTPATIENT
Start: 2025-07-17 | End: 2025-07-19 | Stop reason: HOSPADM

## 2025-07-16 RX ORDER — HYDROMORPHONE HYDROCHLORIDE 1 MG/ML
0.5 INJECTION, SOLUTION INTRAMUSCULAR; INTRAVENOUS; SUBCUTANEOUS ONCE
Status: COMPLETED | OUTPATIENT
Start: 2025-07-16 | End: 2025-07-16

## 2025-07-16 RX ORDER — IBUPROFEN 200 MG
16 TABLET ORAL
Status: DISCONTINUED | OUTPATIENT
Start: 2025-07-16 | End: 2025-07-19 | Stop reason: HOSPADM

## 2025-07-16 RX ORDER — PROMETHAZINE HYDROCHLORIDE 25 MG/1
25 TABLET ORAL EVERY 6 HOURS PRN
Status: DISCONTINUED | OUTPATIENT
Start: 2025-07-16 | End: 2025-07-19 | Stop reason: HOSPADM

## 2025-07-16 RX ORDER — SODIUM CHLORIDE 9 MG/ML
INJECTION, SOLUTION INTRAVENOUS CONTINUOUS
Status: DISCONTINUED | OUTPATIENT
Start: 2025-07-16 | End: 2025-07-19

## 2025-07-16 RX ORDER — AMLODIPINE BESYLATE 5 MG/1
5 TABLET ORAL DAILY
Status: DISCONTINUED | OUTPATIENT
Start: 2025-07-17 | End: 2025-07-19 | Stop reason: HOSPADM

## 2025-07-16 RX ORDER — ONDANSETRON HYDROCHLORIDE 2 MG/ML
4 INJECTION, SOLUTION INTRAVENOUS
Status: COMPLETED | OUTPATIENT
Start: 2025-07-16 | End: 2025-07-16

## 2025-07-16 RX ORDER — POTASSIUM CHLORIDE 7.45 MG/ML
10 INJECTION INTRAVENOUS
Status: COMPLETED | OUTPATIENT
Start: 2025-07-16 | End: 2025-07-16

## 2025-07-16 RX ORDER — DULOXETIN HYDROCHLORIDE 30 MG/1
90 CAPSULE, DELAYED RELEASE ORAL EVERY MORNING
COMMUNITY

## 2025-07-16 RX ORDER — GLUCAGON 1 MG
1 KIT INJECTION
Status: DISCONTINUED | OUTPATIENT
Start: 2025-07-16 | End: 2025-07-19 | Stop reason: HOSPADM

## 2025-07-16 RX ORDER — TAMSULOSIN HYDROCHLORIDE 0.4 MG/1
0.4 CAPSULE ORAL DAILY
Status: DISCONTINUED | OUTPATIENT
Start: 2025-07-16 | End: 2025-07-19 | Stop reason: HOSPADM

## 2025-07-16 RX ORDER — POLYETHYLENE GLYCOL 3350 17 G/17G
17 POWDER, FOR SOLUTION ORAL DAILY PRN
Status: DISCONTINUED | OUTPATIENT
Start: 2025-07-16 | End: 2025-07-19 | Stop reason: HOSPADM

## 2025-07-16 RX ORDER — FENTANYL CITRATE 50 UG/ML
50 INJECTION, SOLUTION INTRAMUSCULAR; INTRAVENOUS
Refills: 0 | Status: COMPLETED | OUTPATIENT
Start: 2025-07-16 | End: 2025-07-16

## 2025-07-16 RX ORDER — TALC
6 POWDER (GRAM) TOPICAL NIGHTLY PRN
Status: DISCONTINUED | OUTPATIENT
Start: 2025-07-16 | End: 2025-07-19 | Stop reason: HOSPADM

## 2025-07-16 RX ORDER — KETOROLAC TROMETHAMINE 30 MG/ML
30 INJECTION, SOLUTION INTRAMUSCULAR; INTRAVENOUS EVERY 8 HOURS
Status: COMPLETED | OUTPATIENT
Start: 2025-07-16 | End: 2025-07-17

## 2025-07-16 RX ORDER — KETOROLAC TROMETHAMINE 30 MG/ML
15 INJECTION, SOLUTION INTRAMUSCULAR; INTRAVENOUS
Status: COMPLETED | OUTPATIENT
Start: 2025-07-16 | End: 2025-07-16

## 2025-07-16 RX ORDER — IBUPROFEN 200 MG
24 TABLET ORAL
Status: DISCONTINUED | OUTPATIENT
Start: 2025-07-16 | End: 2025-07-19 | Stop reason: HOSPADM

## 2025-07-16 RX ORDER — PHENAZOPYRIDINE HYDROCHLORIDE 100 MG/1
100 TABLET, FILM COATED ORAL
Status: DISCONTINUED | OUTPATIENT
Start: 2025-07-16 | End: 2025-07-19 | Stop reason: HOSPADM

## 2025-07-16 RX ADMIN — HYDROMORPHONE HYDROCHLORIDE 0.5 MG: 1 INJECTION, SOLUTION INTRAMUSCULAR; INTRAVENOUS; SUBCUTANEOUS at 08:07

## 2025-07-16 RX ADMIN — PHENAZOPYRIDINE 100 MG: 100 TABLET ORAL at 08:07

## 2025-07-16 RX ADMIN — POTASSIUM CHLORIDE 10 MEQ: 7.46 INJECTION, SOLUTION INTRAVENOUS at 05:07

## 2025-07-16 RX ADMIN — FENTANYL CITRATE 50 MCG: 50 INJECTION INTRAMUSCULAR; INTRAVENOUS at 06:07

## 2025-07-16 RX ADMIN — IOHEXOL 100 ML: 350 INJECTION, SOLUTION INTRAVENOUS at 05:07

## 2025-07-16 RX ADMIN — ONDANSETRON 4 MG: 2 INJECTION INTRAMUSCULAR; INTRAVENOUS at 03:07

## 2025-07-16 RX ADMIN — KETOROLAC TROMETHAMINE 15 MG: 30 INJECTION, SOLUTION INTRAMUSCULAR at 03:07

## 2025-07-16 RX ADMIN — FENTANYL CITRATE 50 MCG: 50 INJECTION INTRAMUSCULAR; INTRAVENOUS at 04:07

## 2025-07-16 RX ADMIN — SODIUM CHLORIDE: 9 INJECTION, SOLUTION INTRAVENOUS at 09:07

## 2025-07-16 RX ADMIN — TAMSULOSIN HYDROCHLORIDE 0.4 MG: 0.4 CAPSULE ORAL at 09:07

## 2025-07-16 RX ADMIN — SODIUM CHLORIDE 1000 ML: 9 INJECTION, SOLUTION INTRAVENOUS at 03:07

## 2025-07-16 RX ADMIN — ERTAPENEM 1 G: 1 INJECTION INTRAMUSCULAR; INTRAVENOUS at 03:07

## 2025-07-16 RX ADMIN — KETOROLAC TROMETHAMINE 30 MG: 30 INJECTION, SOLUTION INTRAMUSCULAR at 10:07

## 2025-07-16 NOTE — ED PROVIDER NOTES
SCRIBE #1 NOTE: I, MIAN RICHARDSON and Ayse Barahona, am scribing for, and in the presence of, Manuela Hackett DO. I have scribed the entire note.       History     Chief Complaint   Patient presents with    Abdominal Pain     Pt states she was told today that she has a antibiotic resistant bacteria in her kidneys and needs IV antibiotics.       Review of patient's allergies indicates:   Allergen Reactions    Codeine Hives     Other reaction(s): CHEST PAINS    Hydromorphone      Other reaction(s): CHEST PAINS    Penicillins Palpitations and Rash     Other reaction(s): SWELLING OF FACE AND ARMS  Rash as child, has tollerated keflex as adult      Shellfish containing products Anaphylaxis    Tigan [trimethobenzamide] Swelling    Sumatriptan succinate Other (See Comments)     Other reaction(s): Other (See Comments)  Patient can't remember what happens when she takes it  parasthias       Morphine     Stadol [butorphanol]     Hydrocodone Palpitations         History of Present Illness     HPI    7/16/2025, 2:35 PM  History obtained from the patient and medical records    History of Present Illness: Tiffany Kamara is a 49 y.o. female patient with a PMHx of Hashimoto's, HTN, hypothyroidism, and UTI who presents to the Emergency Department for evaluation of LLQ abdominal pain which began on Monday. Pt also complains of back pain on her right side, which has since spread to the left. Pt reports N/V, dysuria, and frequency. Symptoms are constant and moderate in severity. No mitigating or exacerbating factors reported. Patient denies any diarrhea, vaginal bleeding, or vaginal discharge.  Pt currently on Keflex due to infection. No further complaints or concerns at this time.       Arrival mode: Personal Transportation    PCP: Sagrario Trammell MD        Past Medical History:  Past Medical History:   Diagnosis Date    Hypertension     Hypothyroidism     Kidney stones     Urinary tract infection, site not specified        Past  Surgical History:  Past Surgical History:   Procedure Laterality Date    APPENDECTOMY      CHOLECYSTECTOMY      DILATION AND CURETTAGE OF UTERUS      HYSTERECTOMY  12/17/2017    LAVH/BS/LO    KNEE ARTHRODESIS      LAPAROSCOPY      Pubovaginal Sling with Lynx and Cystoscopy      REVISION OF VAGINAL CUFF      TONSILLECTOMY           Family History:  Family History   Problem Relation Name Age of Onset    Breast cancer Mother         Social History:  Social History     Tobacco Use    Smoking status: Never     Passive exposure: Never    Smokeless tobacco: Never   Vaping Use    Vaping status: Never Used   Substance and Sexual Activity    Alcohol use: Yes     Comment: occasional    Drug use: Never    Sexual activity: Yes     Partners: Male        Review of Systems     Review of Systems   Constitutional:  Negative for fever.   Gastrointestinal:  Positive for abdominal pain (LLQ), nausea and vomiting. Negative for diarrhea.   Genitourinary:  Positive for dysuria, flank pain and frequency. Negative for vaginal bleeding and vaginal discharge.   Neurological:         (+) tingling      Physical Exam     Initial Vitals [07/16/25 1418]   BP Pulse Resp Temp SpO2   (!) 146/111 108 16 98.3 °F (36.8 °C) 97 %      MAP       --          Physical Exam  Nursing Notes and Vital Signs Reviewed.  Constitutional: Patient is in no acute distress. Well-developed and well-nourished.  Head: Atraumatic. Normocephalic.  Eyes: PERRL.   ENT: Mucous membranes are moist.  Neck: Supple. Full ROM.  Cardiovascular: Tachycardic. No murmurs, rubs, or gallops.   Pulmonary/Chest: No respiratory distress. Clear to auscultation bilaterally. No wheezing or rales.  Abdominal: Soft and non-distended. No rebound, guarding, or rigidity. Good bowel sounds. LLQ abd pain.   Genitourinary: Bilateral CVA tenderness.  Musculoskeletal: Moves all extremities. No obvious deformities. No edema. No calf tenderness.  Skin: Warm and dry.  Neurological:  Alert, awake, and  appropriate.  Normal speech.  No acute focal neurological deficits are appreciated.  Psychiatric: Normal affect. Good eye contact. Appropriate in content.     ED Course   Procedures  ED Vital Signs:  Vitals:    07/16/25 2030 07/16/25 2055 07/16/25 2130 07/16/25 2156   BP: (!) 146/90  (!) 166/95 (!) 148/104   Pulse: 68  80 77   Resp: 20 20 20 18   Temp: 98.2 °F (36.8 °C)   97.8 °F (36.6 °C)   TempSrc: Oral   Oral   SpO2: 97%  99% 99%   Weight:       Height:        07/16/25 2158 07/16/25 2203 07/17/25 0039 07/17/25 0157   BP: (!) 160/87  (!) 142/87    Pulse:  66 64    Resp:   18 18   Temp:   98.6 °F (37 °C)    TempSrc:   Oral    SpO2:   96%    Weight:       Height:        07/17/25 0400 07/17/25 0430 07/17/25 0702 07/17/25 0726   BP:  131/80     Pulse: 62 (!) 58 77    Resp:  18  18   Temp:  97.8 °F (36.6 °C)     TempSrc:  Oral     SpO2:  95%     Weight:       Height:        07/17/25 0801 07/17/25 0806 07/17/25 0902   BP: 122/75     Pulse: 64 64 64   Resp: 18     Temp: 97.7 °F (36.5 °C)     TempSrc: Oral     SpO2: (!) 94%     Weight:      Height:          Abnormal Lab Results:  Labs Reviewed   COMPREHENSIVE METABOLIC PANEL - Abnormal       Result Value    Sodium 134 (*)     Potassium 3.4 (*)     Chloride 104      CO2 21 (*)     Glucose 79      BUN 9      Creatinine 0.8      Calcium 9.0      Protein Total 7.4      Albumin 4.0      Bilirubin Total 0.6       (*)     AST 52 (*)     ALT 61 (*)     Anion Gap 9      eGFR >60     CBC WITH DIFFERENTIAL - Abnormal    WBC 8.48      RBC 4.37      HGB 15.0      HCT 43.0      MCV 98      MCH 34.3 (*)     MCHC 34.9      RDW 11.8      Platelet Count 289      MPV 8.7 (*)     Nucleated RBC 0      Neut % 65.7      Lymph % 21.7      Mono % 8.8      Eos % 2.6      Basophil % 0.5      Imm Grans % 0.7 (*)     Neut # 5.57      Lymph # 1.84      Mono # 0.75      Eos # 0.22      Baso # 0.04      Imm Grans # 0.06 (*)    LACTIC ACID, PLASMA - Normal    Lactic Acid Level 1.0      Narrative:      Falsely low lactic acid results can be found in samples containing >=13.0 mg/dL total bilirubin and/or >=3.5 mg/dL direct bilirubin.    MAGNESIUM - Normal    Magnesium  2.2     HEPATITIS C ANTIBODY - Normal    Hep C Ab Interp Negative     HIV 1 / 2 ANTIBODY - Normal    HIV 1/2 Ag/Ab Negative     CULTURE, URINE   CBC W/ AUTO DIFFERENTIAL    Narrative:     The following orders were created for panel order CBC auto differential.  Procedure                               Abnormality         Status                     ---------                               -----------         ------                     CBC with Differential[[id number]]       Abnormal            Final result                 Please view results for these tests on the individual orders.   HEP C VIRUS HOLD SPECIMEN        All Lab Results:  Results for orders placed or performed during the hospital encounter of 07/16/25   Blood culture #1 **CANNOT BE ORDERED STAT**    Collection Time: 07/16/25  3:07 PM    Specimen: Peripheral, Wrist, Left; Blood   Result Value Ref Range    Blood Culture No Growth After 6 Hours    Blood culture #2 **CANNOT BE ORDERED STAT**    Collection Time: 07/16/25  3:22 PM    Specimen: Peripheral, Forearm, Right; Blood   Result Value Ref Range    Blood Culture No Growth After 6 Hours    Comprehensive metabolic panel    Collection Time: 07/16/25  3:22 PM   Result Value Ref Range    Sodium 134 (L) 136 - 145 mmol/L    Potassium 3.4 (L) 3.5 - 5.1 mmol/L    Chloride 104 95 - 110 mmol/L    CO2 21 (L) 23 - 29 mmol/L    Glucose 79 70 - 110 mg/dL    BUN 9 6 - 20 mg/dL    Creatinine 0.8 0.5 - 1.4 mg/dL    Calcium 9.0 8.7 - 10.5 mg/dL    Protein Total 7.4 6.0 - 8.4 gm/dL    Albumin 4.0 3.5 - 5.2 g/dL    Bilirubin Total 0.6 0.1 - 1.0 mg/dL     (H) 40 - 150 unit/L    AST 52 (H) 11 - 45 unit/L    ALT 61 (H) 10 - 44 unit/L    Anion Gap 9 8 - 16 mmol/L    eGFR >60 >60 mL/min/1.73/m2   Lactic acid, plasma    Collection Time: 07/16/25  3:22 PM    Result Value Ref Range    Lactic Acid Level 1.0 0.5 - 2.2 mmol/L   CBC with Differential    Collection Time: 07/16/25  3:22 PM   Result Value Ref Range    WBC 8.48 3.90 - 12.70 K/uL    RBC 4.37 4.00 - 5.40 M/uL    HGB 15.0 12.0 - 16.0 gm/dL    HCT 43.0 37.0 - 48.5 %    MCV 98 82 - 98 fL    MCH 34.3 (H) 27.0 - 31.0 pg    MCHC 34.9 32.0 - 36.0 g/dL    RDW 11.8 11.5 - 14.5 %    Platelet Count 289 150 - 450 K/uL    MPV 8.7 (L) 9.2 - 12.9 fL    Nucleated RBC 0 <=0 /100 WBC    Neut % 65.7 38 - 73 %    Lymph % 21.7 18 - 48 %    Mono % 8.8 4 - 15 %    Eos % 2.6 <=8 %    Basophil % 0.5 <=1.9 %    Imm Grans % 0.7 (H) 0.0 - 0.5 %    Neut # 5.57 1.8 - 7.7 K/uL    Lymph # 1.84 1 - 4.8 K/uL    Mono # 0.75 0.3 - 1 K/uL    Eos # 0.22 <=0.5 K/uL    Baso # 0.04 <=0.2 K/uL    Imm Grans # 0.06 (H) 0.00 - 0.04 K/uL   Magnesium    Collection Time: 07/16/25  3:22 PM   Result Value Ref Range    Magnesium  2.2 1.6 - 2.6 mg/dL   Hepatitis C Antibody    Collection Time: 07/16/25  3:22 PM   Result Value Ref Range    Hep C Ab Interp Negative Negative   HIV 1/2 Ag/Ab (4th Gen)    Collection Time: 07/16/25  3:22 PM   Result Value Ref Range    HIV 1/2 Ag/Ab Negative Negative   EKG 12-lead    Collection Time: 07/16/25  9:23 PM   Result Value Ref Range    QRS Duration 78 ms    [facility] QTC Calculation 436 ms   Comprehensive Metabolic Panel (CMP)    Collection Time: 07/17/25  6:13 AM   Result Value Ref Range    Sodium 134 (L) 136 - 145 mmol/L    Potassium 3.2 (L) 3.5 - 5.1 mmol/L    Chloride 105 95 - 110 mmol/L    CO2 24 23 - 29 mmol/L    Glucose 78 70 - 110 mg/dL    BUN 6 6 - 20 mg/dL    Creatinine 0.7 0.5 - 1.4 mg/dL    Calcium 8.2 (L) 8.7 - 10.5 mg/dL    Protein Total 6.3 6.0 - 8.4 gm/dL    Albumin 3.4 (L) 3.5 - 5.2 g/dL    Bilirubin Total 0.6 0.1 - 1.0 mg/dL     (H) 40 - 150 unit/L     (H) 11 - 45 unit/L    ALT 81 (H) 10 - 44 unit/L    Anion Gap 5 (L) 8 - 16 mmol/L    eGFR >60 >60 mL/min/1.73/m2   Magnesium    Collection Time: 07/17/25   6:13 AM   Result Value Ref Range    Magnesium  2.1 1.6 - 2.6 mg/dL   CBC with Differential    Collection Time: 07/17/25  6:13 AM   Result Value Ref Range    WBC 6.49 3.90 - 12.70 K/uL    RBC 4.20 4.00 - 5.40 M/uL    HGB 14.3 12.0 - 16.0 gm/dL    HCT 42.1 37.0 - 48.5 %     (H) 82 - 98 fL    MCH 34.0 (H) 27.0 - 31.0 pg    MCHC 34.0 32.0 - 36.0 g/dL    RDW 11.9 11.5 - 14.5 %    Platelet Count 247 150 - 450 K/uL    MPV 8.9 (L) 9.2 - 12.9 fL    Nucleated RBC 0 <=0 /100 WBC    Neut % 59.8 38 - 73 %    Lymph % 27.9 18 - 48 %    Mono % 7.6 4 - 15 %    Eos % 2.8 <=8 %    Basophil % 0.5 <=1.9 %    Imm Grans % 1.4 (H) 0.0 - 0.5 %    Neut # 3.89 1.8 - 7.7 K/uL    Lymph # 1.81 1 - 4.8 K/uL    Mono # 0.49 0.3 - 1 K/uL    Eos # 0.18 <=0.5 K/uL    Baso # 0.03 <=0.2 K/uL    Imm Grans # 0.09 (H) 0.00 - 0.04 K/uL   TSH    Collection Time: 07/17/25  6:13 AM   Result Value Ref Range    TSH 13.169 (H) 0.400 - 4.000 uIU/mL    Free T4 0.89 0.71 - 1.51 ng/dL   T4, free    Collection Time: 07/17/25  6:13 AM   Result Value Ref Range    Free T4 0.90 0.71 - 1.51 ng/dL       Imaging Results:  Imaging Results              CT Abdomen Pelvis With IV Contrast NO Oral Contrast (Final result)  Result time 07/16/25 18:05:07      Final result by Justo Valera DO (07/16/25 18:05:07)                   Impression:     No acute findings.    All CT scans at [this location] are performed using dose modulation techniques as appropriate to a performed exam including the following:  Automated exposure control; adjustment of the mA and/or kV according to patient size (this includes techniques or standardized protocols for targeted exams where dose is matched to indication / reason for exam; i.e. extremities or head); use of iterative reconstruction technique.    Finalized on: 7/16/2025 6:05 PM By:  Justo Valera  [facility]# [id number]      2025-07-16 18:07:09.625     [facility]               Narrative:    EXAM: CT ABDOMEN PELVIS WITH IV CONTRAST    CLINICAL  HISTORY: Left lower quadrant pain    COMPARISON: None    TECHNIQUE: Standard thin-section axial images, with reformatted sagittal and coronal images.    FINDINGS: Minimal bibasilar atelectasis.    Cholecystectomy.  Liver, spleen, pancreas, adrenals, kidneys and ureters are unremarkable.    Vascular structures are unremarkable.  No abdominal or retroperitoneal lymphadenopathy.  No ascites or fat stranding within the abdomen.  No dilatation of the large or small bowel.  Appendix not clearly visualized.    Pelvis: No pelvic free fluid, iliac chain or inguinal lymphadenopathy.  Uterus is absent.  Adnexal regions are unremarkable.    No concerning lytic or sclerotic bony lesions.                                       No EKG ordered.           The Emergency Provider reviewed the vital signs and test results, which are outlined above.     ED Discussion     2:40 PM : Discussed pt's case with Nahun Berkowitz MD, [facility] (Infectious Diseases) who recommends Ertapenem.     6:30 PM: Discussed case with Deloris Mares NP (Hospital Medicine). Dr. Sal agrees with current care and management of pt and accepts admission.   Admitting Service: Hospital Medicine  Admitting Physician: Dr. Sal  Admit to: OBS Med/Tele    6:30 PM: Re-evaluated pt. I have discussed test results, shared treatment plan, and the need for admission with patient/family/caretaker at bedside. Pt and/or family/caretaker express understanding at this time and agree with all information. All questions answered. Pt/caretaker/family member(s) have no further questions or concerns at this time. Pt is ready for admit.           ED Course as of 07/17/25 1105   Wed Jul 16, 2025   1423 Urine culture positive for E coli ESBL sensitive to ertapenem, meropenem, gentamicin, tobramycin, and nitrofurantoin.  Concern for pyelonephritis therefore nitrofurantoin not appropriate. [NF]   1819 49-year-old female presents with ESBL E coli pyelonephritis sensitive to ertapenem,  meropenem, gentamicin, and tobramycin.  Sent here for admission for IV antibiotics.  I discussed with Dr. Berkowitz, who recommended IV ertapenem. [NF]      ED Course User Index  [NF] Manuela Hackett, DO     Medical Decision Making  49-year-old female presents with a UTI.  Cultures positive for ESBL E coli, not sensitive to Keflex which was prescribed.  Sensitive to Macrobid however pyelonephritis to suspected due to bilateral flank pain.  Discussed with infectious disease who recommends IV ertapenem.  Today she is afebrile with no leukocytosis and a normal white blood cell count.  Blood cultures pending.  CMP shows very mild hyponatremia and hypokalemia.  CT abdomen and pelvis is unremarkable.    Differential diagnosis:  Sepsis, Gastritis, Diverticulitis, Pyelonephritis, Kidney stone      Amount and/or Complexity of Data Reviewed  Labs: ordered. Decision-making details documented in ED Course.  Radiology: ordered. Decision-making details documented in ED Course.    Risk  Prescription drug management.                ED Medication(s):  Medications   phenazopyridine tablet 100 mg (100 mg Oral Given 7/17/25 0853)   amLODIPine tablet 5 mg (5 mg Oral Given 7/17/25 0853)   DULoxetine DR capsule 90 mg (90 mg Oral Given 7/17/25 0609)   levothyroxine tablet 175 mcg (175 mcg Oral Given 7/17/25 0608)   sodium chloride 0.9% flush 3 mL (has no administration in time range)   promethazine tablet 25 mg (has no administration in time range)   polyethylene glycol packet 17 g (has no administration in time range)   acetaminophen tablet 650 mg (has no administration in time range)   naloxone 0.4 mg/mL injection 0.02 mg (has no administration in time range)   glucose chewable tablet 16 g (has no administration in time range)   glucose chewable tablet 24 g (has no administration in time range)   dextrose 50% injection 12.5 g (has no administration in time range)   dextrose 50% injection 25 g (has no administration in time range)   glucagon  (human recombinant) injection 1 mg (has no administration in time range)   melatonin tablet 6 mg (has no administration in time range)   ketorolac injection 30 mg (30 mg Intravenous Given 7/17/25 0608)   ertapenem (INVANZ) 1 g in 0.9% NaCl 100 mL IVPB (MB+) (has no administration in time range)   tamsulosin 24 hr capsule 0.4 mg (0.4 mg Oral Given 7/17/25 0853)   0.9% NaCl infusion ( Intravenous New Bag 7/17/25 0642)   oxyCODONE immediate release tablet 5 mg (has no administration in time range)   HYDROmorphone injection 0.5 mg (0.5 mg Intravenous Given 7/17/25 0726)   ondansetron injection 4 mg (4 mg Intravenous Given 7/17/25 0157)   sodium chloride 0.9% bolus 1,000 mL 1,000 mL (0 mLs Intravenous Stopped 7/16/25 1626)   ertapenem (INVANZ) 1 g in 0.9% NaCl 100 mL IVPB (MB+) (0 g Intravenous Stopped 7/16/25 1603)   ondansetron injection 4 mg (4 mg Intravenous Given 7/16/25 1530)   ketorolac injection 15 mg (15 mg Intravenous Given 7/16/25 1531)   fentaNYL 50 mcg/mL injection 50 mcg (50 mcg Intravenous Given 7/16/25 1631)   potassium chloride 10 mEq in 100 mL IVPB (0 mEq Intravenous Stopped 7/16/25 1737)   iohexoL (OMNIPAQUE 350) injection 100 mL (100 mLs Intravenous Given 7/16/25 1735)   fentaNYL 50 mcg/mL injection 50 mcg (50 mcg Intravenous Given 7/16/25 1854)   HYDROmorphone injection 0.5 mg (0.5 mg Intravenous Given 7/16/25 2055)       Current Discharge Medication List                  Scribe Attestation:   Scribe #1: I performed the above scribed service and the documentation accurately describes the services I performed. I attest to the accuracy of the note.     Attending:   Physician Attestation Statement for Scribe #1: I, Manuela Hackett DO, personally performed the services described in this documentation, as scribed by MIAN RICHARDSON, in my presence, and it is both accurate and complete.           Clinical Impression       ICD-10-CM ICD-9-CM   1. Acute pyelonephritis  N10 590.10   2. ESBL (extended  spectrum beta-lactamase) producing bacteria infection  A49.9 041.89    Z16.12 V09.1   3. Chest pain  R07.9 786.50   4. Preoperative clearance  Z01.818 V72.84   5. Hypokalemia  E87.6 276.8   6. Hyponatremia  E87.1 276.1       Disposition:   Disposition: Placed in Observation  Condition: Stable         Manuela Hackett,   07/17/25 1106

## 2025-07-17 PROBLEM — E87.6 HYPOKALEMIA: Status: ACTIVE | Noted: 2025-07-17

## 2025-07-17 LAB
ABSOLUTE EOSINOPHIL (OHS): 0.18 K/UL
ABSOLUTE MONOCYTE (OHS): 0.49 K/UL (ref 0.3–1)
ABSOLUTE NEUTROPHIL COUNT (OHS): 3.89 K/UL (ref 1.8–7.7)
ALBUMIN SERPL BCP-MCNC: 3.4 G/DL (ref 3.5–5.2)
ALP SERPL-CCNC: 165 UNIT/L (ref 40–150)
ALT SERPL W/O P-5'-P-CCNC: 81 UNIT/L (ref 10–44)
ANION GAP (OHS): 5 MMOL/L (ref 8–16)
AST SERPL-CCNC: 164 UNIT/L (ref 11–45)
BASOPHILS # BLD AUTO: 0.03 K/UL
BASOPHILS NFR BLD AUTO: 0.5 %
BILIRUB SERPL-MCNC: 0.6 MG/DL (ref 0.1–1)
BUN SERPL-MCNC: 6 MG/DL (ref 6–20)
CALCIUM SERPL-MCNC: 8.2 MG/DL (ref 8.7–10.5)
CHLORIDE SERPL-SCNC: 105 MMOL/L (ref 95–110)
CO2 SERPL-SCNC: 24 MMOL/L (ref 23–29)
CREAT SERPL-MCNC: 0.7 MG/DL (ref 0.5–1.4)
ERYTHROCYTE [DISTWIDTH] IN BLOOD BY AUTOMATED COUNT: 11.9 % (ref 11.5–14.5)
GFR SERPLBLD CREATININE-BSD FMLA CKD-EPI: >60 ML/MIN/1.73/M2
GLUCOSE SERPL-MCNC: 78 MG/DL (ref 70–110)
HCT VFR BLD AUTO: 42.1 % (ref 37–48.5)
HGB BLD-MCNC: 14.3 GM/DL (ref 12–16)
IMM GRANULOCYTES # BLD AUTO: 0.09 K/UL (ref 0–0.04)
IMM GRANULOCYTES NFR BLD AUTO: 1.4 % (ref 0–0.5)
LYMPHOCYTES # BLD AUTO: 1.81 K/UL (ref 1–4.8)
MAGNESIUM SERPL-MCNC: 2.1 MG/DL (ref 1.6–2.6)
MCH RBC QN AUTO: 34 PG (ref 27–31)
MCHC RBC AUTO-ENTMCNC: 34 G/DL (ref 32–36)
MCV RBC AUTO: 100 FL (ref 82–98)
NUCLEATED RBC (/100WBC) (OHS): 0 /100 WBC
OHS QRS DURATION: 78 MS
OHS QTC CALCULATION: 436 MS
PLATELET # BLD AUTO: 247 K/UL (ref 150–450)
PMV BLD AUTO: 8.9 FL (ref 9.2–12.9)
POTASSIUM SERPL-SCNC: 3.2 MMOL/L (ref 3.5–5.1)
PROT SERPL-MCNC: 6.3 GM/DL (ref 6–8.4)
RBC # BLD AUTO: 4.2 M/UL (ref 4–5.4)
RELATIVE EOSINOPHIL (OHS): 2.8 %
RELATIVE LYMPHOCYTE (OHS): 27.9 % (ref 18–48)
RELATIVE MONOCYTE (OHS): 7.6 % (ref 4–15)
RELATIVE NEUTROPHIL (OHS): 59.8 % (ref 38–73)
SODIUM SERPL-SCNC: 134 MMOL/L (ref 136–145)
T4 FREE SERPL-MCNC: 0.89 NG/DL (ref 0.71–1.51)
T4 FREE SERPL-MCNC: 0.9 NG/DL (ref 0.71–1.51)
TSH SERPL-ACNC: 13.17 UIU/ML (ref 0.4–4)
WBC # BLD AUTO: 6.49 K/UL (ref 3.9–12.7)

## 2025-07-17 PROCEDURE — 63600175 PHARM REV CODE 636 W HCPCS: Mod: JZ,TB | Performed by: NURSE PRACTITIONER

## 2025-07-17 PROCEDURE — 21400001 HC TELEMETRY ROOM

## 2025-07-17 PROCEDURE — 84439 ASSAY OF FREE THYROXINE: CPT | Performed by: NURSE PRACTITIONER

## 2025-07-17 PROCEDURE — 27000207 HC ISOLATION

## 2025-07-17 PROCEDURE — 63600175 PHARM REV CODE 636 W HCPCS: Performed by: STUDENT IN AN ORGANIZED HEALTH CARE EDUCATION/TRAINING PROGRAM

## 2025-07-17 PROCEDURE — 80053 COMPREHEN METABOLIC PANEL: CPT | Performed by: NURSE PRACTITIONER

## 2025-07-17 PROCEDURE — 25000003 PHARM REV CODE 250: Performed by: NURSE PRACTITIONER

## 2025-07-17 PROCEDURE — 96361 HYDRATE IV INFUSION ADD-ON: CPT

## 2025-07-17 PROCEDURE — 85025 COMPLETE CBC W/AUTO DIFF WBC: CPT | Performed by: NURSE PRACTITIONER

## 2025-07-17 PROCEDURE — 36415 COLL VENOUS BLD VENIPUNCTURE: CPT | Performed by: NURSE PRACTITIONER

## 2025-07-17 PROCEDURE — 87086 URINE CULTURE/COLONY COUNT: CPT | Performed by: NURSE PRACTITIONER

## 2025-07-17 PROCEDURE — 25000003 PHARM REV CODE 250: Performed by: STUDENT IN AN ORGANIZED HEALTH CARE EDUCATION/TRAINING PROGRAM

## 2025-07-17 PROCEDURE — 96376 TX/PRO/DX INJ SAME DRUG ADON: CPT

## 2025-07-17 PROCEDURE — 99223 1ST HOSP IP/OBS HIGH 75: CPT | Mod: ,,, | Performed by: UROLOGY

## 2025-07-17 PROCEDURE — 83735 ASSAY OF MAGNESIUM: CPT | Performed by: NURSE PRACTITIONER

## 2025-07-17 PROCEDURE — 96375 TX/PRO/DX INJ NEW DRUG ADDON: CPT

## 2025-07-17 RX ORDER — HYDROMORPHONE HYDROCHLORIDE 1 MG/ML
0.5 INJECTION, SOLUTION INTRAMUSCULAR; INTRAVENOUS; SUBCUTANEOUS EVERY 4 HOURS PRN
Refills: 0 | Status: DISCONTINUED | OUTPATIENT
Start: 2025-07-17 | End: 2025-07-19 | Stop reason: HOSPADM

## 2025-07-17 RX ORDER — OXYCODONE HYDROCHLORIDE 5 MG/1
5 TABLET ORAL EVERY 4 HOURS PRN
Refills: 0 | Status: DISCONTINUED | OUTPATIENT
Start: 2025-07-17 | End: 2025-07-19 | Stop reason: HOSPADM

## 2025-07-17 RX ORDER — ONDANSETRON HYDROCHLORIDE 2 MG/ML
4 INJECTION, SOLUTION INTRAVENOUS EVERY 4 HOURS PRN
Status: DISCONTINUED | OUTPATIENT
Start: 2025-07-17 | End: 2025-07-19 | Stop reason: HOSPADM

## 2025-07-17 RX ORDER — POTASSIUM CHLORIDE 20 MEQ/1
40 TABLET, EXTENDED RELEASE ORAL ONCE
Status: COMPLETED | OUTPATIENT
Start: 2025-07-17 | End: 2025-07-17

## 2025-07-17 RX ADMIN — HYDROMORPHONE HYDROCHLORIDE 0.5 MG: 1 INJECTION, SOLUTION INTRAMUSCULAR; INTRAVENOUS; SUBCUTANEOUS at 11:07

## 2025-07-17 RX ADMIN — TAMSULOSIN HYDROCHLORIDE 0.4 MG: 0.4 CAPSULE ORAL at 08:07

## 2025-07-17 RX ADMIN — SODIUM CHLORIDE 1 G: 9 INJECTION, SOLUTION INTRAVENOUS at 03:07

## 2025-07-17 RX ADMIN — HYDROMORPHONE HYDROCHLORIDE 0.5 MG: 1 INJECTION, SOLUTION INTRAMUSCULAR; INTRAVENOUS; SUBCUTANEOUS at 07:07

## 2025-07-17 RX ADMIN — ONDANSETRON 4 MG: 2 INJECTION INTRAMUSCULAR; INTRAVENOUS at 11:07

## 2025-07-17 RX ADMIN — KETOROLAC TROMETHAMINE 30 MG: 30 INJECTION, SOLUTION INTRAMUSCULAR at 02:07

## 2025-07-17 RX ADMIN — HYDROMORPHONE HYDROCHLORIDE 0.5 MG: 1 INJECTION, SOLUTION INTRAMUSCULAR; INTRAVENOUS; SUBCUTANEOUS at 01:07

## 2025-07-17 RX ADMIN — SODIUM CHLORIDE: 9 INJECTION, SOLUTION INTRAVENOUS at 06:07

## 2025-07-17 RX ADMIN — PROMETHAZINE HYDROCHLORIDE 25 MG: 25 TABLET ORAL at 05:07

## 2025-07-17 RX ADMIN — OXYCODONE HYDROCHLORIDE 5 MG: 5 TABLET ORAL at 05:07

## 2025-07-17 RX ADMIN — DULOXETINE 90 MG: 30 CAPSULE, DELAYED RELEASE ORAL at 06:07

## 2025-07-17 RX ADMIN — SODIUM CHLORIDE: 9 INJECTION, SOLUTION INTRAVENOUS at 02:07

## 2025-07-17 RX ADMIN — PHENAZOPYRIDINE 100 MG: 100 TABLET ORAL at 08:07

## 2025-07-17 RX ADMIN — AMLODIPINE BESYLATE 5 MG: 5 TABLET ORAL at 08:07

## 2025-07-17 RX ADMIN — PHENAZOPYRIDINE 100 MG: 100 TABLET ORAL at 11:07

## 2025-07-17 RX ADMIN — POTASSIUM CHLORIDE 40 MEQ: 1500 TABLET, EXTENDED RELEASE ORAL at 05:07

## 2025-07-17 RX ADMIN — KETOROLAC TROMETHAMINE 30 MG: 30 INJECTION, SOLUTION INTRAMUSCULAR at 06:07

## 2025-07-17 RX ADMIN — PHENAZOPYRIDINE 100 MG: 100 TABLET ORAL at 05:07

## 2025-07-17 RX ADMIN — ONDANSETRON 4 MG: 2 INJECTION INTRAMUSCULAR; INTRAVENOUS at 01:07

## 2025-07-17 RX ADMIN — ONDANSETRON 4 MG: 2 INJECTION INTRAMUSCULAR; INTRAVENOUS at 04:07

## 2025-07-17 RX ADMIN — LEVOTHYROXINE SODIUM 175 MCG: 0.03 TABLET ORAL at 06:07

## 2025-07-17 RX ADMIN — HYDROMORPHONE HYDROCHLORIDE 0.5 MG: 1 INJECTION, SOLUTION INTRAMUSCULAR; INTRAVENOUS; SUBCUTANEOUS at 06:07

## 2025-07-17 NOTE — ASSESSMENT & PLAN NOTE
Patient's blood pressure range in the last 24 hours was: BP  Min: 119/72  Max: 166/95.The patient's inpatient anti-hypertensive regimen is listed below:  Current Antihypertensives  amLODIPine tablet 5 mg, Daily, Oral    Plan  - BP is controlled, no changes needed to their regimen  - will add PRN if needed, currently needs pain control which will help with BP as well

## 2025-07-17 NOTE — SUBJECTIVE & OBJECTIVE
Interval History:  Patient in bed upon exam and reports suprapubic pain, back pain, dizziness, and dysuria.  Ertapenem and and IV hydration continued. K replaced.  AST/ALT elevated with upward trend. IF following.     Review of Systems   Constitutional:  Positive for activity change.   Gastrointestinal:  Positive for abdominal pain (suprapubic).   Genitourinary:  Positive for dysuria.   Musculoskeletal:  Positive for back pain.   Skin: Negative.    Neurological:  Positive for dizziness and headaches.   Psychiatric/Behavioral: Negative.       Objective:     Vital Signs (Most Recent):  Temp: 98.4 °F (36.9 °C) (07/17/25 1141)  Pulse: 62 (07/17/25 1501)  Resp: 16 (07/17/25 1143)  BP: 119/72 (07/17/25 1141)  SpO2: (!) 94 % (07/17/25 1141) Vital Signs (24h Range):  Temp:  [97.7 °F (36.5 °C)-98.6 °F (37 °C)] 98.4 °F (36.9 °C)  Pulse:  [58-80] 62  Resp:  [16-20] 16  SpO2:  [94 %-100 %] 94 %  BP: (119-166)/() 119/72     Weight: 71 kg (156 lb 8.4 oz)  Body mass index is 27.73 kg/m².    Intake/Output Summary (Last 24 hours) at 7/17/2025 1611  Last data filed at 7/17/2025 1328  Gross per 24 hour   Intake 1580 ml   Output --   Net 1580 ml         Physical Exam  HENT:      Mouth/Throat:      Mouth: Mucous membranes are moist.      Pharynx: Oropharynx is clear.   Cardiovascular:      Rate and Rhythm: Normal rate and regular rhythm.      Pulses: Normal pulses.      Heart sounds: Normal heart sounds.   Pulmonary:      Effort: Pulmonary effort is normal.      Breath sounds: Normal breath sounds.   Abdominal:      General: Bowel sounds are normal.      Palpations: Abdomen is soft.      Tenderness: There is abdominal tenderness (suprapubic).   Musculoskeletal:         General: Normal range of motion.   Skin:     General: Skin is warm and dry.   Neurological:      Mental Status: She is alert and oriented to person, place, and time.   Psychiatric:         Mood and Affect: Mood normal.               Significant Labs: All pertinent  labs within the past 24 hours have been reviewed.    Significant Imaging: I have reviewed all pertinent imaging results/findings within the past 24 hours.

## 2025-07-17 NOTE — ASSESSMENT & PLAN NOTE
Patient's blood pressure range in the last 24 hours was: BP  Min: 123/82  Max: 166/95.The patient's inpatient anti-hypertensive regimen is listed below:  Current Antihypertensives  amLODIPine tablet 5 mg, Daily, Oral    Plan  - BP is controlled, no changes needed to their regimen  - will add PRN if needed, currently needs pain control which will help with BP as well

## 2025-07-17 NOTE — HPI
Patient is a 49-year-old female with past medical history significant for hypertension, hypothyroidism, kidney stones, anxiety, and depression who presented to ED with chief complaint of bilateral lower quadrant abdominal pain /right flank pain and severe dysuria.  she reports her symptoms initially started on 07/09 while out of town and was having dysuria, hematuria, and lower abdominal /pelvic pain. She reports urine was bright red and has a picture on her phone of urine-- she believes that she passed a kidney stone which caused the hematuria. She was given cephalexin, ondansetron, and PRN pain medication as well as instructions to follow-up with urologist.  She reports that despite taking the cephalexin since that time, her symptoms have only gotten worse.  She reports fever, chills, fatigue, decreased appetite, nausea, vomiting, bilateral lower abdominal pain which is worse on right side and radiates to right flank and right lower back, difficulty urinating due to dysuria/extreme burning as well as hematuria, urgency, and frequency. She was notified by a provider that she has antibiotic resistant bacteria and instructed to go to ED for admission and IV antibiotics. On arrival to ED, temp 98.3°, heart rate 108, respirations 16, blood pressure 146/111, 97% SpO2 on room air.  Lab workup revealed WBC 8.48, sodium 134, potassium 3.4, CO2 21, BUN 9, creatinine 0.8, magnesium 2.2, alk-phos 164, AST 52, ALT 61, lactic acid 1.0.  blood cultures x2 collected.  CT abdomen and pelvis was done with no acute abnormality.   Per ED note urine culture positive for E coli ESBL sensitive to ertapenem, meropenem, gentamicin, tobramycin, and nitrofurantoin.   ID was consulted per ED who recommended admission and IV ertapenem.

## 2025-07-17 NOTE — ASSESSMENT & PLAN NOTE
Chronic issue  Monitor LFT's- upward trend   On CT, no abnormal findings in liver  Avoid hepatotoxins

## 2025-07-17 NOTE — H&P
[facility] - Emergency Dept.  [facility] Medicine  History & Physical    Patient Name: Tiffany Kamara  MRN: [id number]  Patient Class: OP- Observation  Admission Date: 7/16/2025  Attending Physician: Michel Sal DO   Primary Care Provider: Sagrario Trammell MD         Patient information was obtained from patient, past medical records, and ER records.     Subjective:     Principal Problem:UTI due to extended-spectrum beta lactamase (ESBL) producing Escherichia coli    Chief Complaint:   Chief Complaint   Patient presents with    Abdominal Pain     Pt states she was told today that she has a antibiotic resistant bacteria in her kidneys and needs IV antibiotics.          HPI: Patient is a 49-year-old female with past medical history significant for hypertension, hypothyroidism, kidney stones, anxiety, and depression who presented to ED with chief complaint of bilateral lower quadrant abdominal pain /right flank pain and severe dysuria.  she reports her symptoms initially started on 07/09 while out of town and was having dysuria, hematuria, and lower abdominal /pelvic pain. She reports urine was bright red and has a picture on her phone of urine-- she believes that she passed a kidney stone which caused the hematuria. She was given cephalexin, ondansetron, and PRN pain medication as well as instructions to follow-up with urologist.  She reports that despite taking the cephalexin since that time, her symptoms have only gotten worse.  She reports fever, chills, fatigue, decreased appetite, nausea, vomiting, bilateral lower abdominal pain which is worse on right side and radiates to right flank and right lower back, difficulty urinating due to dysuria/extreme burning as well as hematuria, urgency, and frequency. She was notified by a provider that she has antibiotic resistant bacteria and instructed to go to ED for admission and IV antibiotics. On arrival to ED, temp 98.3°, heart rate 108, respirations 16, blood  pressure 146/111, 97% SpO2 on room air.  Lab workup revealed WBC 8.48, sodium 134, potassium 3.4, CO2 21, BUN 9, creatinine 0.8, magnesium 2.2, alk-phos 164, AST 52, ALT 61, lactic acid 1.0.  blood cultures x2 collected.  CT abdomen and pelvis was done with no acute abnormality.   Per ED note urine culture positive for E coli ESBL sensitive to ertapenem, meropenem, gentamicin, tobramycin, and nitrofurantoin.   ID was consulted per ED who recommended admission and IV ertapenem.    Past Medical History:   Diagnosis Date    Hypertension     Hypothyroidism     Kidney stones     Urinary tract infection, site not specified        Past Surgical History:   Procedure Laterality Date    APPENDECTOMY      CHOLECYSTECTOMY      DILATION AND CURETTAGE OF UTERUS      HYSTERECTOMY  12/17/2017    LAVH/BS/LO    KNEE ARTHRODESIS      LAPAROSCOPY      Pubovaginal Sling with Lynx and Cystoscopy      REVISION OF VAGINAL CUFF      TONSILLECTOMY         Review of patient's allergies indicates:   Allergen Reactions    Codeine Hives     Other reaction(s): CHEST PAINS    Hydromorphone      Other reaction(s): CHEST PAINS    Penicillins Palpitations and Rash     Other reaction(s): SWELLING OF FACE AND ARMS  Rash as child, has tollerated keflex as adult      Shellfish containing products Anaphylaxis    Tigan [trimethobenzamide] Swelling    Sumatriptan succinate Other (See Comments)     Other reaction(s): Other (See Comments)  Patient can't remember what happens when she takes it  parasthias       Morphine     Stadol [butorphanol]     Hydrocodone Palpitations       No current facility-administered medications on file prior to encounter.     Current Outpatient Medications on File Prior to Encounter   Medication Sig    amLODIPine (NORVASC) 5 MG tablet Take 5 mg by mouth once daily.    cephALEXin (KEFLEX) 250 MG capsule Take 1 capsule (250 mg total) by mouth every 8 (eight) hours. for 7 days    DULoxetine (CYMBALTA) 30 MG capsule Take 90 mg by mouth  every morning.    EPINEPHrine (EPIPEN) 0.3 mg/0.3 mL AtIn     HYDROcodone-acetaminophen (NORCO)  mg per tablet Take 1 tablet by mouth every 6 (six) hours as needed for Pain.    levothyroxine (SYNTHROID, LEVOTHROID) 175 MCG tablet Take 175 mcg by mouth every morning.    ondansetron (ZOFRAN-ODT) 4 MG TbDL Take 1 tablet (4 mg total) by mouth every 6 (six) hours as needed.    [DISCONTINUED] cyanocobalamin 1,000 mcg/mL injection INJECT 1ML ONCE MONTHLY    [DISCONTINUED] cyclobenzaprine (FLEXERIL) 10 MG tablet Take 10 mg by mouth.    [DISCONTINUED] dextroamphetamine-amphetamine (ADDERALL XR) 15 MG 24 hr capsule TAKE 1 CAPSULE BY MOUTH EVERY DAY IN THE MORNING FOR 30 DAYS    [DISCONTINUED] dicyclomine (BENTYL) 20 mg tablet SMARTSI Tablet(s) By Mouth Morning-Night    [DISCONTINUED] DULoxetine (CYMBALTA) 60 MG capsule Take 60 mg by mouth once daily.    [DISCONTINUED] hydrOXYzine HCL (ATARAX) 25 MG tablet Take 25 mg by mouth.    [DISCONTINUED] ondansetron (ZOFRAN-ODT) 8 MG TbDL Take 8 mg by mouth 3 (three) times daily.    [DISCONTINUED] pantoprazole (PROTONIX) 20 MG tablet Take 20 mg by mouth every morning.    [DISCONTINUED] promethazine (PHENERGAN) 25 MG tablet Take 25 mg by mouth every 8 (eight) hours as needed.    [DISCONTINUED] traMADoL (ULTRAM) 50 mg tablet Take 50 mg by mouth every 6 (six) hours as needed.     Family History       Problem Relation (Age of Onset)    Breast cancer Mother          Tobacco Use    Smoking status: Never     Passive exposure: Never    Smokeless tobacco: Never   Vaping Use    Vaping status: Never Used   Substance and Sexual Activity    Alcohol use: Yes     Comment: occasional    Drug use: Never    Sexual activity: Yes     Partners: Male     Review of Systems   Constitutional:  Positive for activity change, appetite change, chills, fatigue and fever.   HENT: Negative.     Eyes: Negative.    Respiratory: Negative.  Negative for cough, chest tightness, shortness of breath and wheezing.     Cardiovascular: Negative.  Negative for chest pain, palpitations and leg swelling.   Gastrointestinal:  Positive for abdominal pain, nausea and vomiting. Negative for abdominal distention, blood in stool, constipation and diarrhea.        Bilateral lower quadrant pain - worse on right side and radiates to right flank/right lower back   Endocrine: Negative.    Genitourinary:  Positive for decreased urine volume, difficulty urinating, dysuria, flank pain, frequency, hematuria and urgency.        Suprapubic pain  Bilateral flank pain (R>L)   Musculoskeletal:  Positive for myalgias.   Skin: Negative.    Allergic/Immunologic: Negative.    Neurological:  Positive for weakness. Negative for dizziness, seizures, syncope, speech difficulty, light-headedness and headaches.   Hematological: Negative.    Psychiatric/Behavioral: Negative.       Objective:     Vital Signs (Most Recent):  Temp: 98.2 °F (36.8 °C) (07/16/25 2030)  Pulse: 68 (07/16/25 2030)  Resp: 20 (07/16/25 2055)  BP: (!) 146/90 (07/16/25 2030)  SpO2: 97 % (07/16/25 2030) Vital Signs (24h Range):  Temp:  [98.2 °F (36.8 °C)-98.3 °F (36.8 °C)] 98.2 °F (36.8 °C)  Pulse:  [] 68  Resp:  [16-20] 20  SpO2:  [97 %-100 %] 97 %  BP: (123-146)/() 146/90     Weight: 71 kg (156 lb 8.4 oz)  Body mass index is 27.73 kg/m².     Physical Exam  Vitals reviewed.   Constitutional:       General: She is not in acute distress.     Appearance: She is ill-appearing and toxic-appearing. She is not diaphoretic.   HENT:      Head: Normocephalic and atraumatic.      Nose: Nose normal.      Mouth/Throat:      Mouth: Mucous membranes are moist.      Pharynx: Oropharynx is clear.   Eyes:      Extraocular Movements: Extraocular movements intact.      Pupils: Pupils are equal, round, and reactive to light.   Cardiovascular:      Rate and Rhythm: Normal rate and regular rhythm.      Pulses: Normal pulses.      Heart sounds: Normal heart sounds.   Pulmonary:      Effort: Pulmonary  effort is normal. No respiratory distress.      Breath sounds: Normal breath sounds. No stridor. No wheezing, rhonchi or rales.   Chest:      Chest wall: No tenderness.   Abdominal:      General: Bowel sounds are normal. There is no distension.      Palpations: Abdomen is soft.      Tenderness: There is abdominal tenderness. There is right CVA tenderness. There is no rebound.      Comments: Mild TTP bilateral lower quadrants and suprapubic areas  Right flank TTP   Musculoskeletal:         General: Normal range of motion.      Cervical back: Neck supple.      Right lower leg: No edema.      Left lower leg: No edema.   Skin:     General: Skin is warm and dry.      Capillary Refill: Capillary refill takes less than 2 seconds.   Neurological:      General: No focal deficit present.      Mental Status: She is alert and oriented to person, place, and time.      Motor: No weakness.   Psychiatric:         Mood and Affect: Mood normal.         Behavior: Behavior normal.         Thought Content: Thought content normal.         Judgment: Judgment normal.              CRANIAL NERVES     CN III, IV, VI   Pupils are equal, round, and reactive to light.       Significant Labs: All pertinent labs within the past 24 hours have been reviewed.    Blood Cultures x 2 collected per ED, pending    CBC:   Recent Labs   Lab 07/16/25  1522   WBC 8.48   HGB 15.0   HCT 43.0        CMP:   Recent Labs   Lab 07/16/25  1522   *   K 3.4*      CO2 21*   GLU 79   BUN 9   CREATININE 0.8   CALCIUM 9.0   PROT 7.4   ALBUMIN 4.0   BILITOT 0.6   ALKPHOS 164*   AST 52*   ALT 61*   ANIONGAP 9     Lactic Acid:   Recent Labs   Lab 07/16/25  1522   LACTATE 1.0       Magnesium:   Recent Labs   Lab 07/16/25  1522   MG 2.2     Negative rapid HIV screening  Negative Hep C Ab    EKG ordered/pending    Significant Imaging: I have reviewed all pertinent imaging results/findings within the past 24 hours.    CT abdomen/pelvis with IV  contrast:  FINDINGS: Minimal bibasilar atelectasis.    Cholecystectomy.  Liver, spleen, pancreas, adrenals, kidneys and ureters are unremarkable.    Vascular structures are unremarkable.  No abdominal or retroperitoneal lymphadenopathy.  No ascites or fat stranding within the abdomen.  No dilatation of the large or small bowel.  Appendix not clearly visualized.    Pelvis: No pelvic free fluid, iliac chain or inguinal lymphadenopathy.  Uterus is absent.  Adnexal regions are unremarkable.    No concerning lytic or sclerotic bony lesions.     Impression:      No acute findings     Assessment/Plan:     Assessment & Plan  UTI due to extended-spectrum beta lactamase (ESBL) producing Escherichia coli  ID consulted per ED and recommended IV ertapenem  Repeat UA pending  --Very symptomatic with dysuria, suprapubic pain, flank pain, nausea/vomiting, fever/chills  Pyridium ordered  Toradol ordered  PRN pain medication ordered  PRN antiemetics ordered  Hydrating with IV fluids    Acquired hypothyroidism  Continue Synthroid  Check TSH and free T4    Elevated liver enzymes  Chronic issue  Monitor LFT's  On CT, no abnormal findings in liver  Avoid hepatotoxins    Essential hypertension  Patient's blood pressure range in the last 24 hours was: BP  Min: 123/82  Max: 166/95.The patient's inpatient anti-hypertensive regimen is listed below:  Current Antihypertensives  amLODIPine tablet 5 mg, Daily, Oral    Plan  - BP is controlled, no changes needed to their regimen  - will add PRN if needed, currently needs pain control which will help with BP as well  Hypokalemia  Patient's most recent potassium results are listed below.   Recent Labs     07/16/25  1522   K 3.4*     Plan  - Replete potassium per protocol  - Monitor potassium Daily      VTE Risk Mitigation (From admission, onward)           Ordered     IP VTE LOW RISK PATIENT  Once         07/16/25 2115     Place sequential compression device  Until discontinued         07/16/25 2115                     On 07/17/2025, patient should be placed in hospital observation services under my care in collaboration with Dr Michel Sal.           Peggy Haley NP  Department of Hospital Medicine  [facility] - Emergency Dept.

## 2025-07-17 NOTE — PLAN OF CARE
[facility] - Med Surg  Initial Discharge Assessment       Primary Care Provider: Sagrario Trammell MD    Admission Diagnosis: Preoperative clearance [Z01.818]  ESBL (extended spectrum beta-lactamase) producing bacteria infection [A49.9, Z16.12]  Chest pain [R07.9]    Admission Date: 7/16/2025  Expected Discharge Date: Per Attending     Transition of Care Barriers: None    Payor: BLUE CROSS BLUE SHIELD / Plan: [place] PPO / Product Type: PPO /     Extended Emergency Contact Information  Primary Emergency Contact: Juarez Kamara  Mobile Phone: [phone]  Relation: Spouse    Discharge Plan A: Home         [id number] - [place], [place] - [address]  [address]  [place] [address]  Phone: [phone] Fax: [phone]    [facility] - [place], [place] - [address] [address]  [address] [place] [address]  [facility] ([place], [place]) [address]  Phone: [phone] Fax: [phone]    [id number] - [place] - [address]  [address] [facility] [facility] [address]  Phone: [phone] Fax: [phone]    [id number] - [place], [place] - [address]  [address]  [place] [address]  Phone: [phone] Fax: [phone]      Initial Assessment (most recent)       Adult Discharge Assessment - 07/17/25 1123          Discharge Assessment    Assessment Type Discharge Planning Assessment     Confirmed/corrected address, phone number and insurance Yes     Confirmed Demographics Correct on Facesheet     Source of Information patient     Communicated CAMELIA with patient/caregiver Date not available/Unable to determine     People in Home child(bhakti), dependent     Do you expect to return to your current living situation? Yes     Do you have help at home or someone to help you manage your care at home? No     Prior to hospitilization cognitive status: Alert/Oriented     Current cognitive status: Alert/Oriented      Walking or Climbing Stairs Difficulty no     Dressing/Bathing Difficulty no     Home Accessibility wheelchair accessible     Home Layout Able to live on 1st floor     Equipment Currently Used at Home none     Readmission within 30 days? No     Patient currently being followed by outpatient case management? No     Do you currently have service(s) that help you manage your care at home? No     Do you take prescription medications? Yes     Do you have prescription coverage? Yes     Do you have any problems affording any of your prescribed medications? No     Is the patient taking medications as prescribed? yes     Who is going to help you get home at discharge? TBD     How do you get to doctors appointments? car, drives self     Are you on dialysis? No     Do you take coumadin? No     Discharge Plan A Home     DME Needed Upon Discharge  none     Discharge Plan discussed with: Patient     Transition of Care Barriers None                   Anticipated DC dispo: Home   Prior Level of Function: Independent    People in home:  daughter     Comments:  CM met with patient at bedside to introduce role and discuss discharge planning. CM discharge needs depends on hospital progress. CM will continue following to assist with other needs.     Sw discussed possible need for home IV abx; pt stated he has never done this before. Pt stated she will be able to drive to an outpatient clinic to get weekly labs, as needed.     Sw to follow up with pt regarding final ID recs once available.

## 2025-07-17 NOTE — ASSESSMENT & PLAN NOTE
Patient's most recent potassium results are listed below.   Recent Labs     07/16/25  1522 07/17/25  0613   K 3.4* 3.2*     Plan  - Replete potassium per protocol  - Monitor potassium Daily

## 2025-07-17 NOTE — ASSESSMENT & PLAN NOTE
Patient's most recent potassium results are listed below.   Recent Labs     07/16/25  1522   K 3.4*     Plan  - Replete potassium per protocol  - Monitor potassium Daily

## 2025-07-17 NOTE — SUBJECTIVE & OBJECTIVE
Past Medical History:   Diagnosis Date    Hypertension     Hypothyroidism     Kidney stones     Urinary tract infection, site not specified        Past Surgical History:   Procedure Laterality Date    APPENDECTOMY      CHOLECYSTECTOMY      DILATION AND CURETTAGE OF UTERUS      HYSTERECTOMY  12/17/2017    LAVH/BS/SHAZIA    KNEE ARTHRODESIS      LAPAROSCOPY      Pubovaginal Sling with Lynx and Cystoscopy      REVISION OF VAGINAL CUFF      TONSILLECTOMY         Review of patient's allergies indicates:   Allergen Reactions    Codeine Hives     Other reaction(s): CHEST PAINS    Hydromorphone      Other reaction(s): CHEST PAINS    Penicillins Palpitations and Rash     Other reaction(s): SWELLING OF FACE AND ARMS  Rash as child, has tollerated keflex as adult      Shellfish containing products Anaphylaxis    Tigan [trimethobenzamide] Swelling    Sumatriptan succinate Other (See Comments)     Other reaction(s): Other (See Comments)  Patient can't remember what happens when she takes it  parasthias       Morphine     Stadol [butorphanol]     Hydrocodone Palpitations       No current facility-administered medications on file prior to encounter.     Current Outpatient Medications on File Prior to Encounter   Medication Sig    amLODIPine (NORVASC) 5 MG tablet Take 5 mg by mouth once daily.    cephALEXin (KEFLEX) 250 MG capsule Take 1 capsule (250 mg total) by mouth every 8 (eight) hours. for 7 days    DULoxetine (CYMBALTA) 30 MG capsule Take 90 mg by mouth every morning.    EPINEPHrine (EPIPEN) 0.3 mg/0.3 mL AtIn     HYDROcodone-acetaminophen (NORCO)  mg per tablet Take 1 tablet by mouth every 6 (six) hours as needed for Pain.    levothyroxine (SYNTHROID, LEVOTHROID) 175 MCG tablet Take 175 mcg by mouth every morning.    ondansetron (ZOFRAN-ODT) 4 MG TbDL Take 1 tablet (4 mg total) by mouth every 6 (six) hours as needed.    [DISCONTINUED] cyanocobalamin 1,000 mcg/mL injection INJECT 1ML ONCE MONTHLY    [DISCONTINUED]  cyclobenzaprine (FLEXERIL) 10 MG tablet Take 10 mg by mouth.    [DISCONTINUED] dextroamphetamine-amphetamine (ADDERALL XR) 15 MG 24 hr capsule TAKE 1 CAPSULE BY MOUTH EVERY DAY IN THE MORNING FOR 30 DAYS    [DISCONTINUED] dicyclomine (BENTYL) 20 mg tablet SMARTSI Tablet(s) By Mouth Morning-Night    [DISCONTINUED] DULoxetine (CYMBALTA) 60 MG capsule Take 60 mg by mouth once daily.    [DISCONTINUED] hydrOXYzine HCL (ATARAX) 25 MG tablet Take 25 mg by mouth.    [DISCONTINUED] ondansetron (ZOFRAN-ODT) 8 MG TbDL Take 8 mg by mouth 3 (three) times daily.    [DISCONTINUED] pantoprazole (PROTONIX) 20 MG tablet Take 20 mg by mouth every morning.    [DISCONTINUED] promethazine (PHENERGAN) 25 MG tablet Take 25 mg by mouth every 8 (eight) hours as needed.    [DISCONTINUED] traMADoL (ULTRAM) 50 mg tablet Take 50 mg by mouth every 6 (six) hours as needed.     Family History       Problem Relation (Age of Onset)    Breast cancer Mother          Tobacco Use    Smoking status: Never     Passive exposure: Never    Smokeless tobacco: Never   Vaping Use    Vaping status: Never Used   Substance and Sexual Activity    Alcohol use: Yes     Comment: occasional    Drug use: Never    Sexual activity: Yes     Partners: Male     Review of Systems   Constitutional:  Positive for activity change, appetite change, chills, fatigue and fever.   HENT: Negative.     Eyes: Negative.    Respiratory: Negative.  Negative for cough, chest tightness, shortness of breath and wheezing.    Cardiovascular: Negative.  Negative for chest pain, palpitations and leg swelling.   Gastrointestinal:  Positive for abdominal pain, nausea and vomiting. Negative for abdominal distention, blood in stool, constipation and diarrhea.        Bilateral lower quadrant pain - worse on right side and radiates to right flank/right lower back   Endocrine: Negative.    Genitourinary:  Positive for decreased urine volume, difficulty urinating, dysuria, flank pain, frequency,  hematuria and urgency.        Suprapubic pain  Bilateral flank pain (R>L)   Musculoskeletal:  Positive for myalgias.   Skin: Negative.    Allergic/Immunologic: Negative.    Neurological:  Positive for weakness. Negative for dizziness, seizures, syncope, speech difficulty, light-headedness and headaches.   Hematological: Negative.    Psychiatric/Behavioral: Negative.       Objective:     Vital Signs (Most Recent):  Temp: 98.2 °F (36.8 °C) (07/16/25 2030)  Pulse: 68 (07/16/25 2030)  Resp: 20 (07/16/25 2055)  BP: (!) 146/90 (07/16/25 2030)  SpO2: 97 % (07/16/25 2030) Vital Signs (24h Range):  Temp:  [98.2 °F (36.8 °C)-98.3 °F (36.8 °C)] 98.2 °F (36.8 °C)  Pulse:  [] 68  Resp:  [16-20] 20  SpO2:  [97 %-100 %] 97 %  BP: (123-146)/() 146/90     Weight: 71 kg (156 lb 8.4 oz)  Body mass index is 27.73 kg/m².     Physical Exam  Vitals reviewed.   Constitutional:       General: She is not in acute distress.     Appearance: She is ill-appearing and toxic-appearing. She is not diaphoretic.   HENT:      Head: Normocephalic and atraumatic.      Nose: Nose normal.      Mouth/Throat:      Mouth: Mucous membranes are moist.      Pharynx: Oropharynx is clear.   Eyes:      Extraocular Movements: Extraocular movements intact.      Pupils: Pupils are equal, round, and reactive to light.   Cardiovascular:      Rate and Rhythm: Normal rate and regular rhythm.      Pulses: Normal pulses.      Heart sounds: Normal heart sounds.   Pulmonary:      Effort: Pulmonary effort is normal. No respiratory distress.      Breath sounds: Normal breath sounds. No stridor. No wheezing, rhonchi or rales.   Chest:      Chest wall: No tenderness.   Abdominal:      General: Bowel sounds are normal. There is no distension.      Palpations: Abdomen is soft.      Tenderness: There is abdominal tenderness. There is right CVA tenderness. There is no rebound.      Comments: Mild TTP bilateral lower quadrants and suprapubic areas  Right flank TTP    Musculoskeletal:         General: Normal range of motion.      Cervical back: Neck supple.      Right lower leg: No edema.      Left lower leg: No edema.   Skin:     General: Skin is warm and dry.      Capillary Refill: Capillary refill takes less than 2 seconds.   Neurological:      General: No focal deficit present.      Mental Status: She is alert and oriented to person, place, and time.      Motor: No weakness.   Psychiatric:         Mood and Affect: Mood normal.         Behavior: Behavior normal.         Thought Content: Thought content normal.         Judgment: Judgment normal.              CRANIAL NERVES     CN III, IV, VI   Pupils are equal, round, and reactive to light.       Significant Labs: All pertinent labs within the past 24 hours have been reviewed.    Blood Cultures x 2 collected per ED, pending    CBC:   Recent Labs   Lab 07/16/25  1522   WBC 8.48   HGB 15.0   HCT 43.0        CMP:   Recent Labs   Lab 07/16/25  1522   *   K 3.4*      CO2 21*   GLU 79   BUN 9   CREATININE 0.8   CALCIUM 9.0   PROT 7.4   ALBUMIN 4.0   BILITOT 0.6   ALKPHOS 164*   AST 52*   ALT 61*   ANIONGAP 9     Lactic Acid:   Recent Labs   Lab 07/16/25  1522   LACTATE 1.0       Magnesium:   Recent Labs   Lab 07/16/25  1522   MG 2.2     Negative rapid HIV screening  Negative Hep C Ab    EKG ordered/pending    Significant Imaging: I have reviewed all pertinent imaging results/findings within the past 24 hours.    CT abdomen/pelvis with IV contrast:  FINDINGS: Minimal bibasilar atelectasis.    Cholecystectomy.  Liver, spleen, pancreas, adrenals, kidneys and ureters are unremarkable.    Vascular structures are unremarkable.  No abdominal or retroperitoneal lymphadenopathy.  No ascites or fat stranding within the abdomen.  No dilatation of the large or small bowel.  Appendix not clearly visualized.    Pelvis: No pelvic free fluid, iliac chain or inguinal lymphadenopathy.  Uterus is absent.  Adnexal regions are  unremarkable.    No concerning lytic or sclerotic bony lesions.     Impression:      No acute findings

## 2025-07-17 NOTE — HOSPITAL COURSE
Admitted to hospital medicine for management of UTI due to ESBL producing E coli post outpatient failure on cephalexin.  CT abdomen and pelvis showed no acute abnormality. Per ED, previous urine culture positive for E coli ESBL with sensitivities reviewed and ID was consulted with IV ertapenem. Ertapenem initiated per ID and IV hydration continued.  Urine culture and blood culture results pending. K replaced.  AST/ALT elevated with upward trend.  TSH elevated with normal free T4 with Synthroid continued.  Urinary symptoms controlled on Pyridium. On 7/18/2025, mild symptom improvement with current plan of care continued.  Patient evaluated by Urology with no additional urologic recommendations with 10-14 days culture sensitive antibiotics recommended.  Urine culture results pending.  We will defer to Infectious Disease for final antibiotic selection. On 7/19/2025, urine culture grew no growth. Results discussed with ID and Fosfomycin every other elsie x 3 doses recommended. Patient seen and examined and deemed medically stable for discharge with patient in agreement. Vital signs stable with no signs of acute distress witnessed or reported. Patient instructed to follow up with PCP and Urology upon discharge for further evaluation.

## 2025-07-17 NOTE — ASSESSMENT & PLAN NOTE
ID consulted per ED and recommended IV ertapenem  Repeat UA pending  --Very symptomatic with dysuria, suprapubic pain, flank pain, nausea/vomiting, fever/chills  Pyridium ordered  Toradol ordered  PRN pain medication ordered  PRN antiemetics ordered  Hydrating with IV fluids

## 2025-07-17 NOTE — CONSULTS
Urology Consult    Consulting Physician: Dominique CHEN    Reason for consultation: UTI    Chief Complaint:  UTI due to extended-spectrum beta lactamase (ESBL) producing Escherichia coli    HPI:    Tiffany Kamara is a 49 y.o. female who presented to emergency room with dysuria abdominal pain low back pain.  She was also having fever and chills.  She also has had gross hematuria.  She ultimately was diagnosed with a ESBL E coli UTI.  She is currently being treated.  She has no history of recurrent urinary infections.  She did have a sling performed at the time of hysterectomy several years ago for stress incontinence.  She does have some recurrent stress incontinence but it is not severe.  She has no flank pain.  She does have a remote history of kidney stones that she passed.  She has had a number of scans including recent ones here CT noncontrast and with contrast.    Past Medical History:   Diagnosis Date    Hypertension     Hypothyroidism     Kidney stones     Urinary tract infection, site not specified         Past Surgical History:   Procedure Laterality Date    APPENDECTOMY      CHOLECYSTECTOMY      DILATION AND CURETTAGE OF UTERUS      HYSTERECTOMY  12/17/2017    LAVH/BS/LO    KNEE ARTHRODESIS      LAPAROSCOPY      Pubovaginal Sling with Lynx and Cystoscopy      REVISION OF VAGINAL CUFF      TONSILLECTOMY          Social History     Socioeconomic History    Marital status:    Tobacco Use    Smoking status: Never     Passive exposure: Never    Smokeless tobacco: Never   Vaping Use    Vaping status: Never Used   Substance and Sexual Activity    Alcohol use: Yes     Comment: occasional    Drug use: Never    Sexual activity: Yes     Partners: Male     Social Drivers of Health     Financial Resource Strain: Low Risk  (7/17/2025)    Overall Financial Resource Strain (CARDIA)     Difficulty of Paying Living Expenses: Not hard at all   Food Insecurity: No Food Insecurity (7/17/2025)    Hunger Vital Sign      Worried About Running Out of Food in the Last Year: Never true     Ran Out of Food in the Last Year: Never true   Transportation Needs: No Transportation Needs (7/17/2025)    PRAPARE - Transportation     Lack of Transportation (Medical): No     Lack of Transportation (Non-Medical): No   Physical Activity: Insufficiently Active (11/14/2023)    Received from [facility] and [facility]    Exercise Vital Sign     Days of Exercise per Week: 2 days     Minutes of Exercise per Session: 20 min   Stress: No Stress Concern Present (7/17/2025)    [employer] [place] [employer] - Occupational Stress Questionnaire     Feeling of Stress : Only a little   Housing Stability: Low Risk  (7/17/2025)    Housing Stability Vital Sign     Unable to Pay for Housing in the Last Year: No     Homeless in the Last Year: No        Family History   Problem Relation Name Age of Onset    Breast cancer Mother          Review of patient's allergies indicates:   Allergen Reactions    Codeine Hives     Other reaction(s): CHEST PAINS    Hydromorphone      Other reaction(s): CHEST PAINS    Penicillins Palpitations and Rash     Other reaction(s): SWELLING OF FACE AND ARMS  Rash as child, has tollerated keflex as adult      Shellfish containing products Anaphylaxis    Tigan [trimethobenzamide] Swelling    Sumatriptan succinate Other (See Comments)     Other reaction(s): Other (See Comments)  Patient can't remember what happens when she takes it  parasthias       Morphine     Stadol [butorphanol]     Hydrocodone Palpitations       Medications:      Medications Ordered Prior to Encounter[1]    VITALS (Last 24H):  Temp:  [97.7 °F (36.5 °C)-98.6 °F (37 °C)]   Pulse:  [58-80]   Resp:  [16-20]   BP: (119-166)/()   SpO2:  [94 %-100 %]     INTAKE & OUTPUT (Last 24H):    Intake/Output Summary (Last 24 hours) at 7/17/2025 1726  Last data filed at 7/17/2025 1328  Gross per 24 hour  "  Intake 580 ml   Output --   Net 580 ml         PHYSICAL EXAM:    No acute distress alert and oriented   Respirations even unlabored   Abdomen is soft nontender    Laboratory Data:  Reviewed and noted in plan where applicable. Please see chart for full laboratory data.    No results for input(s): "CPK", "CPKMB", "TROPONINI", "MB" in the last 24 hours. No results for input(s): "POCTGLUCOSE" in the last 24 hours.     Lab Results   Component Value Date    INR 1 08/19/2024    INR 1 10/01/2023    INR 1.1 06/28/2023       Lab Results   Component Value Date    WBC 6.49 07/17/2025    HGB 14.3 07/17/2025    HCT 42.1 07/17/2025     (H) 07/17/2025     07/17/2025       BMP  Recent Labs   Lab 07/17/25  0613   GLU 78   *   K 3.2*      CO2 24   BUN 6   CREATININE 0.7   CALCIUM 8.2*   MG 2.1         Radiology:  Reviewed and noted in plan where applicable. Please see chart for full radiology data.  CT Abdomen Pelvis With IV Contrast NO Oral Contrast  Narrative: EXAM: CT ABDOMEN PELVIS WITH IV CONTRAST    CLINICAL HISTORY: Left lower quadrant pain    COMPARISON: None    TECHNIQUE: Standard thin-section axial images, with reformatted sagittal and coronal images.    FINDINGS: Minimal bibasilar atelectasis.    Cholecystectomy.  Liver, spleen, pancreas, adrenals, kidneys and ureters are unremarkable.    Vascular structures are unremarkable.  No abdominal or retroperitoneal lymphadenopathy.  No ascites or fat stranding within the abdomen.  No dilatation of the large or small bowel.  Appendix not clearly visualized.    Pelvis: No pelvic free fluid, iliac chain or inguinal lymphadenopathy.  Uterus is absent.  Adnexal regions are unremarkable.    No concerning lytic or sclerotic bony lesions.  Impression:  No acute findings.    All CT scans at [this location] are performed using dose modulation techniques as appropriate to a performed exam including the following:  Automated exposure control; adjustment of the mA " and/or kV according to patient size (this includes techniques or standardized protocols for targeted exams where dose is matched to indication / reason for exam; i.e. extremities or head); use of iterative reconstruction technique.    Finalized on: 7/16/2025 6:05 PM By:  Justo Valera  [facility]# [id number]      2025-07-16 18:07:09.625     [facility]       ASSESSMENT/PLAN:     Hemorrhagic cystitis secondary to ESBL E coli.  Recommend minimum 10-14 day a culture specific antibiotic treatment.  Has had upper tract evaluation which is normal.  No evidence of pyelonephritis.  She seems to be emptying well by CT scan and has no history of recurrent urinary infections.  No additional urologic recommendations         [1]   No current facility-administered medications on file prior to encounter.     Current Outpatient Medications on File Prior to Encounter   Medication Sig Dispense Refill    amLODIPine (NORVASC) 5 MG tablet Take 5 mg by mouth once daily.      cephALEXin (KEFLEX) 250 MG capsule Take 1 capsule (250 mg total) by mouth every 8 (eight) hours. for 7 days 21 capsule 0    DULoxetine (CYMBALTA) 30 MG capsule Take 90 mg by mouth every morning.      EPINEPHrine (EPIPEN) 0.3 mg/0.3 mL AtIn       HYDROcodone-acetaminophen (NORCO)  mg per tablet Take 1 tablet by mouth every 6 (six) hours as needed for Pain. 12 tablet 0    levothyroxine (SYNTHROID, LEVOTHROID) 175 MCG tablet Take 175 mcg by mouth every morning.      ondansetron (ZOFRAN-ODT) 4 MG TbDL Take 1 tablet (4 mg total) by mouth every 6 (six) hours as needed. 12 tablet 0

## 2025-07-17 NOTE — PROGRESS NOTES
[facility] Medicine  Progress Note    Patient Name: Tiffany Kamara  MRN: [id number]  Patient Class: IP- Inpatient   Admission Date: 7/16/2025  Length of Stay: 0 days  Attending Physician: Ankita Fox MD  Primary Care Provider: Sagrario Trammell MD      Subjective     Principal Problem:UTI due to extended-spectrum beta lactamase (ESBL) producing Escherichia coli      HPI:  Patient is a 49-year-old female with past medical history significant for hypertension, hypothyroidism, kidney stones, anxiety, and depression who presented to ED with chief complaint of bilateral lower quadrant abdominal pain /right flank pain and severe dysuria.  she reports her symptoms initially started on 07/09 while out of town and was having dysuria, hematuria, and lower abdominal /pelvic pain. She reports urine was bright red and has a picture on her phone of urine-- she believes that she passed a kidney stone which caused the hematuria. She was given cephalexin, ondansetron, and PRN pain medication as well as instructions to follow-up with urologist.  She reports that despite taking the cephalexin since that time, her symptoms have only gotten worse.  She reports fever, chills, fatigue, decreased appetite, nausea, vomiting, bilateral lower abdominal pain which is worse on right side and radiates to right flank and right lower back, difficulty urinating due to dysuria/extreme burning as well as hematuria, urgency, and frequency. She was notified by a provider that she has antibiotic resistant bacteria and instructed to go to ED for admission and IV antibiotics. On arrival to ED, temp 98.3°, heart rate 108, respirations 16, blood pressure 146/111, 97% SpO2 on room air.  Lab workup revealed WBC 8.48, sodium 134, potassium 3.4, CO2 21, BUN 9, creatinine 0.8, magnesium 2.2, alk-phos 164, AST 52, ALT 61, lactic acid 1.0.  blood cultures x2 collected.  CT abdomen and pelvis was done with no acute abnormality.   Per ED note  urine culture positive for E coli ESBL sensitive to ertapenem, meropenem, gentamicin, tobramycin, and nitrofurantoin.   ID was consulted per ED who recommended admission and IV ertapenem.    Overview/Hospital Course:  Admitted to hospital medicine for management of UTI due to ESBL producing E coli post outpatient failure on cephalexin.  CT abdomen and pelvis showed no acute abnormality. Per ED, previous urine culture positive for E coli ESBL with sensitivities reviewed and ID was consulted with IV ertapenem. Ertapenem initiated per ID and IV hydration continued.  Urine culture and blood culture results pending. K replaced.  AST/ALT elevated with upward trend.  TSH elevated with normal free T4 with Synthroid continued.  Urinary symptoms controlled on Pyridium.     Interval History:  Patient in bed upon exam and reports suprapubic pain, back pain, dizziness, and dysuria.  Ertapenem and and IV hydration continued. K replaced.  AST/ALT elevated with upward trend. ID following.     Review of Systems   Constitutional:  Positive for activity change.   Gastrointestinal:  Positive for abdominal pain (suprapubic).   Genitourinary:  Positive for dysuria.   Musculoskeletal:  Positive for back pain.   Skin: Negative.    Neurological:  Positive for dizziness and headaches.   Psychiatric/Behavioral: Negative.       Objective:     Vital Signs (Most Recent):  Temp: 98.4 °F (36.9 °C) (07/17/25 1141)  Pulse: 62 (07/17/25 1501)  Resp: 16 (07/17/25 1143)  BP: 119/72 (07/17/25 1141)  SpO2: (!) 94 % (07/17/25 1141) Vital Signs (24h Range):  Temp:  [97.7 °F (36.5 °C)-98.6 °F (37 °C)] 98.4 °F (36.9 °C)  Pulse:  [58-80] 62  Resp:  [16-20] 16  SpO2:  [94 %-100 %] 94 %  BP: (119-166)/() 119/72     Weight: 71 kg (156 lb 8.4 oz)  Body mass index is 27.73 kg/m².    Intake/Output Summary (Last 24 hours) at 7/17/2025 1611  Last data filed at 7/17/2025 1328  Gross per 24 hour   Intake 1580 ml   Output --   Net 1580 ml         Physical  Exam  HENT:      Mouth/Throat:      Mouth: Mucous membranes are moist.      Pharynx: Oropharynx is clear.   Cardiovascular:      Rate and Rhythm: Normal rate and regular rhythm.      Pulses: Normal pulses.      Heart sounds: Normal heart sounds.   Pulmonary:      Effort: Pulmonary effort is normal.      Breath sounds: Normal breath sounds.   Abdominal:      General: Bowel sounds are normal.      Palpations: Abdomen is soft.      Tenderness: There is abdominal tenderness (suprapubic).   Musculoskeletal:         General: Normal range of motion.   Skin:     General: Skin is warm and dry.   Neurological:      Mental Status: She is alert and oriented to person, place, and time.   Psychiatric:         Mood and Affect: Mood normal.               Significant Labs: All pertinent labs within the past 24 hours have been reviewed.    Significant Imaging: I have reviewed all pertinent imaging results/findings within the past 24 hours.      Assessment & Plan  UTI due to extended-spectrum beta lactamase (ESBL) producing Escherichia coli  ID consulted per ED and recommended IV ertapenem  -failed outpatient antibiotic therapy on cephalexin.  Repeat UA pending  --Very symptomatic with dysuria, suprapubic pain, flank pain, nausea/vomiting, fever/chills  Pyridium ordered  Toradol ordered  PRN pain medication ordered  PRN antiemetics ordered  Hydrating with IV fluids    Acquired hypothyroidism  Continue Synthroid  TSH 13.169 and free T4 normal    Elevated liver enzymes  Chronic issue  Monitor LFT's- upward trend   On CT, no abnormal findings in liver  Avoid hepatotoxins    Essential hypertension  Patient's blood pressure range in the last 24 hours was: BP  Min: 119/72  Max: 166/95.The patient's inpatient anti-hypertensive regimen is listed below:  Current Antihypertensives  amLODIPine tablet 5 mg, Daily, Oral    Plan  - BP is controlled, no changes needed to their regimen  - will add PRN if needed, currently needs pain control which  will help with BP as well  Hypokalemia  Patient's most recent potassium results are listed below.   Recent Labs     07/16/25  1522 07/17/25  0613   K 3.4* 3.2*     Plan  - Replete potassium per protocol  - Monitor potassium Daily    VTE Risk Mitigation (From admission, onward)           Ordered     IP VTE LOW RISK PATIENT  Once         07/16/25 2115     Place sequential compression device  Until discontinued         07/16/25 2115                    Discharge Planning   CAMELIA: 7/20/2025     Code Status: Full Code   Medical Readiness for Discharge Date:   Discharge Plan A: Home                        Amanda Acevedo NP  Department of Hospital Medicine   [facility] Med S[facility]

## 2025-07-17 NOTE — ASSESSMENT & PLAN NOTE
ID consulted per ED and recommended IV ertapenem  -failed outpatient antibiotic therapy on cephalexin.  Repeat UA pending  --Very symptomatic with dysuria, suprapubic pain, flank pain, nausea/vomiting, fever/chills  Pyridium ordered  Toradol ordered  PRN pain medication ordered  PRN antiemetics ordered  Hydrating with IV fluids

## 2025-07-17 NOTE — PLAN OF CARE
Discussed poc with pt, pt verbalized understanding    Purposeful rounding every 2hours    VS wnl  Cardiac monitoring in use, pt is NSR, tele monitor #[id number]  Fall precautions in place, remains injury free  Pain and nausea under control with PRN meds    IVFs  Accurate I&Os  Abx given as prescribed  Bed locked at lowest position  Call light within reach    Chart check complete  Will cont with POC

## 2025-07-18 LAB
ABSOLUTE EOSINOPHIL (OHS): 0.32 K/UL
ABSOLUTE MONOCYTE (OHS): 0.54 K/UL (ref 0.3–1)
ABSOLUTE NEUTROPHIL COUNT (OHS): 3.15 K/UL (ref 1.8–7.7)
ALBUMIN SERPL BCP-MCNC: 3.1 G/DL (ref 3.5–5.2)
ALP SERPL-CCNC: 141 UNIT/L (ref 40–150)
ALT SERPL W/O P-5'-P-CCNC: 56 UNIT/L (ref 10–44)
ANION GAP (OHS): 6 MMOL/L (ref 8–16)
AST SERPL-CCNC: 45 UNIT/L (ref 11–45)
BACTERIA UR CULT: NO GROWTH
BASOPHILS # BLD AUTO: 0.04 K/UL
BASOPHILS NFR BLD AUTO: 0.7 %
BILIRUB SERPL-MCNC: 0.3 MG/DL (ref 0.1–1)
BUN SERPL-MCNC: 5 MG/DL (ref 6–20)
CALCIUM SERPL-MCNC: 8.2 MG/DL (ref 8.7–10.5)
CHLORIDE SERPL-SCNC: 106 MMOL/L (ref 95–110)
CO2 SERPL-SCNC: 24 MMOL/L (ref 23–29)
CREAT SERPL-MCNC: 0.7 MG/DL (ref 0.5–1.4)
ERYTHROCYTE [DISTWIDTH] IN BLOOD BY AUTOMATED COUNT: 12 % (ref 11.5–14.5)
GFR SERPLBLD CREATININE-BSD FMLA CKD-EPI: >60 ML/MIN/1.73/M2
GLUCOSE SERPL-MCNC: 87 MG/DL (ref 70–110)
HCT VFR BLD AUTO: 39.5 % (ref 37–48.5)
HGB BLD-MCNC: 13.2 GM/DL (ref 12–16)
IMM GRANULOCYTES # BLD AUTO: 0.07 K/UL (ref 0–0.04)
IMM GRANULOCYTES NFR BLD AUTO: 1.2 % (ref 0–0.5)
LYMPHOCYTES # BLD AUTO: 1.7 K/UL (ref 1–4.8)
MAGNESIUM SERPL-MCNC: 1.9 MG/DL (ref 1.6–2.6)
MCH RBC QN AUTO: 34.1 PG (ref 27–31)
MCHC RBC AUTO-ENTMCNC: 33.4 G/DL (ref 32–36)
MCV RBC AUTO: 102 FL (ref 82–98)
NUCLEATED RBC (/100WBC) (OHS): 0 /100 WBC
PLATELET # BLD AUTO: 234 K/UL (ref 150–450)
PMV BLD AUTO: 8.5 FL (ref 9.2–12.9)
POTASSIUM SERPL-SCNC: 4.3 MMOL/L (ref 3.5–5.1)
PROT SERPL-MCNC: 5.7 GM/DL (ref 6–8.4)
RBC # BLD AUTO: 3.87 M/UL (ref 4–5.4)
RELATIVE EOSINOPHIL (OHS): 5.5 %
RELATIVE LYMPHOCYTE (OHS): 29.2 % (ref 18–48)
RELATIVE MONOCYTE (OHS): 9.3 % (ref 4–15)
RELATIVE NEUTROPHIL (OHS): 54.1 % (ref 38–73)
SODIUM SERPL-SCNC: 136 MMOL/L (ref 136–145)
WBC # BLD AUTO: 5.82 K/UL (ref 3.9–12.7)

## 2025-07-18 PROCEDURE — 36415 COLL VENOUS BLD VENIPUNCTURE: CPT | Performed by: NURSE PRACTITIONER

## 2025-07-18 PROCEDURE — 25000003 PHARM REV CODE 250: Performed by: INTERNAL MEDICINE

## 2025-07-18 PROCEDURE — 63600175 PHARM REV CODE 636 W HCPCS: Performed by: NURSE PRACTITIONER

## 2025-07-18 PROCEDURE — 99223 1ST HOSP IP/OBS HIGH 75: CPT | Mod: NSCH,,, | Performed by: INTERNAL MEDICINE

## 2025-07-18 PROCEDURE — 27000207 HC ISOLATION

## 2025-07-18 PROCEDURE — 63600175 PHARM REV CODE 636 W HCPCS: Performed by: STUDENT IN AN ORGANIZED HEALTH CARE EDUCATION/TRAINING PROGRAM

## 2025-07-18 PROCEDURE — 85025 COMPLETE CBC W/AUTO DIFF WBC: CPT | Performed by: NURSE PRACTITIONER

## 2025-07-18 PROCEDURE — 83735 ASSAY OF MAGNESIUM: CPT | Performed by: NURSE PRACTITIONER

## 2025-07-18 PROCEDURE — 21400001 HC TELEMETRY ROOM

## 2025-07-18 PROCEDURE — 99900035 HC TECH TIME PER 15 MIN (STAT)

## 2025-07-18 PROCEDURE — 63600175 PHARM REV CODE 636 W HCPCS: Mod: JZ,TB | Performed by: NURSE PRACTITIONER

## 2025-07-18 PROCEDURE — 94799 UNLISTED PULMONARY SVC/PX: CPT

## 2025-07-18 PROCEDURE — 80053 COMPREHEN METABOLIC PANEL: CPT | Performed by: NURSE PRACTITIONER

## 2025-07-18 PROCEDURE — 25000003 PHARM REV CODE 250: Performed by: NURSE PRACTITIONER

## 2025-07-18 RX ORDER — KETOROLAC TROMETHAMINE 30 MG/ML
30 INJECTION, SOLUTION INTRAMUSCULAR; INTRAVENOUS ONCE
Status: DISCONTINUED | OUTPATIENT
Start: 2025-07-18 | End: 2025-07-18

## 2025-07-18 RX ORDER — KETOROLAC TROMETHAMINE 30 MG/ML
30 INJECTION, SOLUTION INTRAMUSCULAR; INTRAVENOUS ONCE
Status: COMPLETED | OUTPATIENT
Start: 2025-07-18 | End: 2025-07-18

## 2025-07-18 RX ADMIN — HYDROMORPHONE HYDROCHLORIDE 0.5 MG: 1 INJECTION, SOLUTION INTRAMUSCULAR; INTRAVENOUS; SUBCUTANEOUS at 10:07

## 2025-07-18 RX ADMIN — SODIUM CHLORIDE: 9 INJECTION, SOLUTION INTRAVENOUS at 05:07

## 2025-07-18 RX ADMIN — PHENAZOPYRIDINE 100 MG: 100 TABLET ORAL at 09:07

## 2025-07-18 RX ADMIN — DULOXETINE 90 MG: 30 CAPSULE, DELAYED RELEASE ORAL at 08:07

## 2025-07-18 RX ADMIN — PHENAZOPYRIDINE 100 MG: 100 TABLET ORAL at 04:07

## 2025-07-18 RX ADMIN — PHENAZOPYRIDINE 100 MG: 100 TABLET ORAL at 11:07

## 2025-07-18 RX ADMIN — TAMSULOSIN HYDROCHLORIDE 0.4 MG: 0.4 CAPSULE ORAL at 09:07

## 2025-07-18 RX ADMIN — HYDROMORPHONE HYDROCHLORIDE 0.5 MG: 1 INJECTION, SOLUTION INTRAMUSCULAR; INTRAVENOUS; SUBCUTANEOUS at 04:07

## 2025-07-18 RX ADMIN — PROMETHAZINE HYDROCHLORIDE 25 MG: 25 TABLET ORAL at 03:07

## 2025-07-18 RX ADMIN — HYDROMORPHONE HYDROCHLORIDE 0.5 MG: 1 INJECTION, SOLUTION INTRAMUSCULAR; INTRAVENOUS; SUBCUTANEOUS at 06:07

## 2025-07-18 RX ADMIN — LEVOTHYROXINE SODIUM 175 MCG: 0.03 TABLET ORAL at 05:07

## 2025-07-18 RX ADMIN — PROMETHAZINE HYDROCHLORIDE 25 MG: 25 TABLET ORAL at 10:07

## 2025-07-18 RX ADMIN — SODIUM CHLORIDE: 9 INJECTION, SOLUTION INTRAVENOUS at 04:07

## 2025-07-18 RX ADMIN — HYDROMORPHONE HYDROCHLORIDE 0.5 MG: 1 INJECTION, SOLUTION INTRAMUSCULAR; INTRAVENOUS; SUBCUTANEOUS at 01:07

## 2025-07-18 RX ADMIN — PROMETHAZINE HYDROCHLORIDE 25 MG: 25 TABLET ORAL at 08:07

## 2025-07-18 RX ADMIN — AMLODIPINE BESYLATE 5 MG: 5 TABLET ORAL at 09:07

## 2025-07-18 RX ADMIN — KETOROLAC TROMETHAMINE 30 MG: 30 INJECTION, SOLUTION INTRAMUSCULAR at 12:07

## 2025-07-18 RX ADMIN — SODIUM CHLORIDE 1 G: 9 INJECTION, SOLUTION INTRAVENOUS at 03:07

## 2025-07-18 RX ADMIN — HYDROMORPHONE HYDROCHLORIDE 0.5 MG: 1 INJECTION, SOLUTION INTRAMUSCULAR; INTRAVENOUS; SUBCUTANEOUS at 08:07

## 2025-07-18 RX ADMIN — PROMETHAZINE HYDROCHLORIDE 25 MG: 25 TABLET ORAL at 01:07

## 2025-07-18 NOTE — PROGRESS NOTES
[facility] Medicine  Progress Note    Patient Name: Tiffany Kamara  MRN: [id number]  Patient Class: IP- Inpatient   Admission Date: 7/16/2025  Length of Stay: 1 days  Attending Physician: Ankita Fox MD  Primary Care Provider: Sagrario Trammell MD        Subjective     Principal Problem:UTI due to extended-spectrum beta lactamase (ESBL) producing Escherichia coli        HPI:  Patient is a 49-year-old female with past medical history significant for hypertension, hypothyroidism, kidney stones, anxiety, and depression who presented to ED with chief complaint of bilateral lower quadrant abdominal pain /right flank pain and severe dysuria.  she reports her symptoms initially started on 07/09 while out of town and was having dysuria, hematuria, and lower abdominal /pelvic pain. She reports urine was bright red and has a picture on her phone of urine-- she believes that she passed a kidney stone which caused the hematuria. She was given cephalexin, ondansetron, and PRN pain medication as well as instructions to follow-up with urologist.  She reports that despite taking the cephalexin since that time, her symptoms have only gotten worse.  She reports fever, chills, fatigue, decreased appetite, nausea, vomiting, bilateral lower abdominal pain which is worse on right side and radiates to right flank and right lower back, difficulty urinating due to dysuria/extreme burning as well as hematuria, urgency, and frequency. She was notified by a provider that she has antibiotic resistant bacteria and instructed to go to ED for admission and IV antibiotics. On arrival to ED, temp 98.3°, heart rate 108, respirations 16, blood pressure 146/111, 97% SpO2 on room air.  Lab workup revealed WBC 8.48, sodium 134, potassium 3.4, CO2 21, BUN 9, creatinine 0.8, magnesium 2.2, alk-phos 164, AST 52, ALT 61, lactic acid 1.0.  blood cultures x2 collected.  CT abdomen and pelvis was done with no acute abnormality.   Per ED  note urine culture positive for E coli ESBL sensitive to ertapenem, meropenem, gentamicin, tobramycin, and nitrofurantoin.   ID was consulted per ED who recommended admission and IV ertapenem.    Overview/Hospital Course:  Admitted to hospital medicine for management of UTI due to ESBL producing E coli post outpatient failure on cephalexin.  CT abdomen and pelvis showed no acute abnormality. Per ED, previous urine culture positive for E coli ESBL with sensitivities reviewed and ID was consulted with IV ertapenem. Ertapenem initiated per ID and IV hydration continued.  Urine culture and blood culture results pending. K replaced.  AST/ALT elevated with upward trend.  TSH elevated with normal free T4 with Synthroid continued.  Urinary symptoms controlled on Pyridium. On 7/18/2025, mild symptom improvement with current plan of care continued.  Patient evaluated by Urology with no additional urologic recommendations with 10-14 days culture sensitive antibiotics recommended.  Urine culture results pending.  We will defer to Infectious Disease for final antibiotic selection.    Interval History:  Patient in bed upon evaluation with headache, weakness, fatigue, nausea, and left flank pain.  Antiemetics and analgesia continued as needed.  Toradol IV given for pain. URine culture results pending. Infectious disease following.    Review of Systems   Constitutional:  Positive for activity change and fatigue.   Gastrointestinal:  Positive for abdominal pain (suprapubic), nausea, and vomiting   Genitourinary:  Positive for dysuria.   Musculoskeletal:  Positive for back pain -L flank.   Skin: Negative.    Neurological:  Positive for weakness and headaches. Negative for dizziness.   Psychiatric/Behavioral: Negative.       Objective:     Vital Signs (Most Recent):  Temp: 99.4 °F (37.4 °C) (07/18/25 1156)  Pulse: 78 (07/18/25 1156)  Resp: 18 (07/18/25 1156)  BP: 123/78 (07/18/25 1156)  SpO2: 98 % (07/18/25 1156) Vital Signs (24h  Range):  Temp:  [97.7 °F (36.5 °C)-99.4 °F (37.4 °C)] 99.4 °F (37.4 °C)  Pulse:  [59-78] 78  Resp:  [18-19] 18  SpO2:  [93 %-98 %] 98 %  BP: (103-125)/(63-81) 123/78     Weight: 71 kg (156 lb 8.4 oz)  Body mass index is 27.73 kg/m².    Intake/Output Summary (Last 24 hours) at 7/18/2025 1311  Last data filed at 7/18/2025 1059  Gross per 24 hour   Intake 4514.05 ml   Output --   Net 4514.05 ml         Physical Exam  HENT:      Mouth/Throat:      Mouth: Mucous membranes are moist.      Pharynx: Oropharynx is clear.   Cardiovascular:      Rate and Rhythm: Normal rate and regular rhythm.      Pulses: Normal pulses.      Heart sounds: Normal heart sounds.   Pulmonary:      Effort: Pulmonary effort is normal.      Breath sounds: Normal breath sounds.   Abdominal:      General: Bowel sounds are normal.      Palpations: Abdomen is soft.      Tenderness: There is abdominal tenderness (suprapubic).   Musculoskeletal:         General: Normal range of motion.      Cervical back: Normal range of motion.   Skin:     General: Skin is warm and dry.   Neurological:      Mental Status: She is alert and oriented to person, place, and time.   Psychiatric:         Mood and Affect: Mood normal.               Significant Labs: All pertinent labs within the past 24 hours have been reviewed.    Significant Imaging: I have reviewed all pertinent imaging results/findings within the past 24 hours.      Assessment & Plan  UTI due to extended-spectrum beta lactamase (ESBL) producing Escherichia coli  ID consulted per ED and recommended IV ertapenem  -failed outpatient antibiotic therapy on cephalexin.  Repeat UA pending  --symptomatic with dysuria, suprapubic pain, flank pain, nausea/vomiting, fever/chills  Pyridium ordered  Toradol ordered  PRN pain medication ordered  PRN antiemetics ordered  Hydrating with IV fluids  -Urology evaluated with no additional recommendations with discharge 10-14 days culture sensitive antibiotics when  appropriate    Acquired hypothyroidism  Continue Synthroid  TSH 13.169 and free T4 normal    Elevated liver enzymes  Chronic issue  Monitor LFT's- >45- ALT 81>56  On CT, no abnormal findings in liver  Avoid hepatotoxins    Essential hypertension  Patient's blood pressure range in the last 24 hours was: BP  Min: 103/63  Max: 125/67.The patient's inpatient anti-hypertensive regimen is listed below:  Current Antihypertensives  amLODIPine tablet 5 mg, Daily, Oral    Plan  - BP is controlled, no changes needed to their regimen  - will add PRN if needed, currently needs pain control which will help with BP as well  Hypokalemia  Patient's most recent potassium results are listed below.   Recent Labs     07/16/25  1522 07/17/25  0613 07/18/25  0630   K 3.4* 3.2* 4.3     Plan  - Replete potassium per protocol  - Monitor potassium Daily    VTE Risk Mitigation (From admission, onward)           Ordered     IP VTE LOW RISK PATIENT  Once         07/16/25 2115     Place sequential compression device  Until discontinued         07/16/25 2115                    Discharge Planning   CAMELIA: 7/21/2025     Code Status: Full Code   Medical Readiness for Discharge Date:   Discharge Plan A: Home                        Amanda Acevedo NP  Department of Hospital Medicine   [facility]

## 2025-07-18 NOTE — ASSESSMENT & PLAN NOTE
Patient's blood pressure range in the last 24 hours was: BP  Min: 103/63  Max: 125/67.The patient's inpatient anti-hypertensive regimen is listed below:  Current Antihypertensives  amLODIPine tablet 5 mg, Daily, Oral    Plan  - BP is controlled, no changes needed to their regimen  - will add PRN if needed, currently needs pain control which will help with BP as well

## 2025-07-18 NOTE — ASSESSMENT & PLAN NOTE
ID consulted per ED and recommended IV ertapenem  -failed outpatient antibiotic therapy on cephalexin.  Repeat UA pending  --symptomatic with dysuria, suprapubic pain, flank pain, nausea/vomiting, fever/chills  Pyridium ordered  Toradol ordered  PRN pain medication ordered  PRN antiemetics ordered  Hydrating with IV fluids  -Urology evaluated with no additional recommendations with discharge 10-14 days culture sensitive antibiotics when appropriate

## 2025-07-18 NOTE — ASSESSMENT & PLAN NOTE
Patient's most recent potassium results are listed below.   Recent Labs     07/16/25  1522 07/17/25  0613 07/18/25  0630   K 3.4* 3.2* 4.3     Plan  - Replete potassium per protocol  - Monitor potassium Daily

## 2025-07-18 NOTE — ASSESSMENT & PLAN NOTE
Chronic issue  Monitor LFT's- >45- ALT 81>56  On CT, no abnormal findings in liver  Avoid hepatotoxins

## 2025-07-18 NOTE — SUBJECTIVE & OBJECTIVE
Interval History:  Patient in bed upon evaluation with headache, weakness, fatigue, nausea, and left flank pain.  Antiemetics and analgesia continued as needed.  Infectious disease following.    Review of Systems   Constitutional:  Positive for activity change.   Gastrointestinal:  Positive for abdominal pain (suprapubic).   Genitourinary:  Positive for dysuria.   Musculoskeletal:  Positive for back pain.   Skin: Negative.    Neurological:  Positive for weakness and headaches. Negative for dizziness.   Psychiatric/Behavioral: Negative.       Objective:     Vital Signs (Most Recent):  Temp: 99.4 °F (37.4 °C) (07/18/25 1156)  Pulse: 78 (07/18/25 1156)  Resp: 18 (07/18/25 1156)  BP: 123/78 (07/18/25 1156)  SpO2: 98 % (07/18/25 1156) Vital Signs (24h Range):  Temp:  [97.7 °F (36.5 °C)-99.4 °F (37.4 °C)] 99.4 °F (37.4 °C)  Pulse:  [59-78] 78  Resp:  [18-19] 18  SpO2:  [93 %-98 %] 98 %  BP: (103-125)/(63-81) 123/78     Weight: 71 kg (156 lb 8.4 oz)  Body mass index is 27.73 kg/m².    Intake/Output Summary (Last 24 hours) at 7/18/2025 1311  Last data filed at 7/18/2025 1059  Gross per 24 hour   Intake 4514.05 ml   Output --   Net 4514.05 ml         Physical Exam  HENT:      Mouth/Throat:      Mouth: Mucous membranes are moist.      Pharynx: Oropharynx is clear.   Cardiovascular:      Rate and Rhythm: Normal rate and regular rhythm.      Pulses: Normal pulses.      Heart sounds: Normal heart sounds.   Pulmonary:      Effort: Pulmonary effort is normal.      Breath sounds: Normal breath sounds.   Abdominal:      General: Bowel sounds are normal.      Palpations: Abdomen is soft.      Tenderness: There is abdominal tenderness (suprapubic).   Musculoskeletal:         General: Normal range of motion.      Cervical back: Normal range of motion.   Skin:     General: Skin is warm and dry.   Neurological:      Mental Status: She is alert and oriented to person, place, and time.   Psychiatric:         Mood and Affect: Mood normal.                Significant Labs: All pertinent labs within the past 24 hours have been reviewed.    Significant Imaging: I have reviewed all pertinent imaging results/findings within the past 24 hours.

## 2025-07-18 NOTE — PLAN OF CARE
Discussed poc with pt, pt verbalized understanding    Purposeful rounding every 2hours    VS wnl  Cardiac monitoring in use, pt is NSR, tele monitor # [id number]  Fall precautions in place, remains injury free  Pain and nausea under control with PRN meds    IVFs  Accurate I&Os  Abx given as prescribed  Bed locked at lowest position  Call light within reach    Chart check complete  Will cont with POC    Problem: Adult Inpatient Plan of Care  Goal: Plan of Care Review  Outcome: Progressing  Goal: Patient-Specific Goal (Individualized)  Outcome: Progressing  Goal: Absence of Hospital-Acquired Illness or Injury  Outcome: Progressing  Goal: Optimal Comfort and Wellbeing  Outcome: Progressing  Goal: Readiness for Transition of Care  Outcome: Progressing     Problem: Infection  Goal: Absence of Infection Signs and Symptoms  Outcome: Progressing

## 2025-07-19 VITALS
DIASTOLIC BLOOD PRESSURE: 68 MMHG | BODY MASS INDEX: 27.73 KG/M2 | WEIGHT: 156.5 LBS | HEIGHT: 63 IN | RESPIRATION RATE: 16 BRPM | HEART RATE: 75 BPM | SYSTOLIC BLOOD PRESSURE: 117 MMHG | TEMPERATURE: 99 F | OXYGEN SATURATION: 94 %

## 2025-07-19 LAB
ABSOLUTE EOSINOPHIL (OHS): 0.25 K/UL
ABSOLUTE MONOCYTE (OHS): 0.52 K/UL (ref 0.3–1)
ABSOLUTE NEUTROPHIL COUNT (OHS): 3.03 K/UL (ref 1.8–7.7)
ALBUMIN SERPL BCP-MCNC: 3.1 G/DL (ref 3.5–5.2)
ALP SERPL-CCNC: 139 UNIT/L (ref 40–150)
ALT SERPL W/O P-5'-P-CCNC: 57 UNIT/L (ref 10–44)
ANION GAP (OHS): 6 MMOL/L (ref 8–16)
AST SERPL-CCNC: 72 UNIT/L (ref 11–45)
BASOPHILS # BLD AUTO: 0.04 K/UL
BASOPHILS NFR BLD AUTO: 0.7 %
BILIRUB SERPL-MCNC: 0.3 MG/DL (ref 0.1–1)
BUN SERPL-MCNC: 6 MG/DL (ref 6–20)
CALCIUM SERPL-MCNC: 8.5 MG/DL (ref 8.7–10.5)
CHLORIDE SERPL-SCNC: 104 MMOL/L (ref 95–110)
CO2 SERPL-SCNC: 28 MMOL/L (ref 23–29)
CREAT SERPL-MCNC: 0.7 MG/DL (ref 0.5–1.4)
ERYTHROCYTE [DISTWIDTH] IN BLOOD BY AUTOMATED COUNT: 11.9 % (ref 11.5–14.5)
GFR SERPLBLD CREATININE-BSD FMLA CKD-EPI: >60 ML/MIN/1.73/M2
GLUCOSE SERPL-MCNC: 82 MG/DL (ref 70–110)
HCT VFR BLD AUTO: 37.9 % (ref 37–48.5)
HGB BLD-MCNC: 12.7 GM/DL (ref 12–16)
HOLD SPECIMEN: NORMAL
IMM GRANULOCYTES # BLD AUTO: 0.03 K/UL (ref 0–0.04)
IMM GRANULOCYTES NFR BLD AUTO: 0.6 % (ref 0–0.5)
LYMPHOCYTES # BLD AUTO: 1.51 K/UL (ref 1–4.8)
MAGNESIUM SERPL-MCNC: 1.9 MG/DL (ref 1.6–2.6)
MCH RBC QN AUTO: 34.4 PG (ref 27–31)
MCHC RBC AUTO-ENTMCNC: 33.5 G/DL (ref 32–36)
MCV RBC AUTO: 103 FL (ref 82–98)
NUCLEATED RBC (/100WBC) (OHS): 0 /100 WBC
PLATELET # BLD AUTO: 237 K/UL (ref 150–450)
PMV BLD AUTO: 8.6 FL (ref 9.2–12.9)
POTASSIUM SERPL-SCNC: 4.3 MMOL/L (ref 3.5–5.1)
PROT SERPL-MCNC: 5.6 GM/DL (ref 6–8.4)
RBC # BLD AUTO: 3.69 M/UL (ref 4–5.4)
RELATIVE EOSINOPHIL (OHS): 4.6 %
RELATIVE LYMPHOCYTE (OHS): 28.1 % (ref 18–48)
RELATIVE MONOCYTE (OHS): 9.7 % (ref 4–15)
RELATIVE NEUTROPHIL (OHS): 56.3 % (ref 38–73)
SODIUM SERPL-SCNC: 138 MMOL/L (ref 136–145)
WBC # BLD AUTO: 5.38 K/UL (ref 3.9–12.7)

## 2025-07-19 PROCEDURE — 84075 ASSAY ALKALINE PHOSPHATASE: CPT | Performed by: NURSE PRACTITIONER

## 2025-07-19 PROCEDURE — 25000003 PHARM REV CODE 250: Performed by: STUDENT IN AN ORGANIZED HEALTH CARE EDUCATION/TRAINING PROGRAM

## 2025-07-19 PROCEDURE — 63600175 PHARM REV CODE 636 W HCPCS: Performed by: STUDENT IN AN ORGANIZED HEALTH CARE EDUCATION/TRAINING PROGRAM

## 2025-07-19 PROCEDURE — 85025 COMPLETE CBC W/AUTO DIFF WBC: CPT | Performed by: NURSE PRACTITIONER

## 2025-07-19 PROCEDURE — 25000003 PHARM REV CODE 250: Performed by: INTERNAL MEDICINE

## 2025-07-19 PROCEDURE — 25000003 PHARM REV CODE 250: Performed by: NURSE PRACTITIONER

## 2025-07-19 PROCEDURE — 63600175 PHARM REV CODE 636 W HCPCS: Performed by: NURSE PRACTITIONER

## 2025-07-19 PROCEDURE — 83735 ASSAY OF MAGNESIUM: CPT | Performed by: NURSE PRACTITIONER

## 2025-07-19 PROCEDURE — 99900035 HC TECH TIME PER 15 MIN (STAT)

## 2025-07-19 PROCEDURE — 36415 COLL VENOUS BLD VENIPUNCTURE: CPT | Performed by: NURSE PRACTITIONER

## 2025-07-19 RX ORDER — GRANULES FOR ORAL 3 G/1
3 POWDER ORAL EVERY OTHER DAY
Qty: 9 G | Refills: 0 | Status: SHIPPED | OUTPATIENT
Start: 2025-07-19 | End: 2025-07-24

## 2025-07-19 RX ORDER — PROCHLORPERAZINE EDISYLATE 5 MG/ML
5 INJECTION INTRAMUSCULAR; INTRAVENOUS ONCE
Status: COMPLETED | OUTPATIENT
Start: 2025-07-19 | End: 2025-07-19

## 2025-07-19 RX ORDER — KETOROLAC TROMETHAMINE 30 MG/ML
15 INJECTION, SOLUTION INTRAMUSCULAR; INTRAVENOUS ONCE
Status: COMPLETED | OUTPATIENT
Start: 2025-07-19 | End: 2025-07-19

## 2025-07-19 RX ORDER — PHENAZOPYRIDINE HYDROCHLORIDE 100 MG/1
100 TABLET, FILM COATED ORAL
Qty: 30 TABLET | Refills: 0 | Status: SHIPPED | OUTPATIENT
Start: 2025-07-19 | End: 2025-07-29

## 2025-07-19 RX ORDER — OXYCODONE HYDROCHLORIDE 5 MG/1
5 TABLET ORAL EVERY 8 HOURS PRN
Qty: 6 TABLET | Refills: 0 | Status: SHIPPED | OUTPATIENT
Start: 2025-07-19 | End: 2025-07-21

## 2025-07-19 RX ADMIN — LEVOTHYROXINE SODIUM 175 MCG: 0.03 TABLET ORAL at 06:07

## 2025-07-19 RX ADMIN — PHENAZOPYRIDINE 100 MG: 100 TABLET ORAL at 08:07

## 2025-07-19 RX ADMIN — SODIUM CHLORIDE 1 G: 9 INJECTION, SOLUTION INTRAVENOUS at 03:07

## 2025-07-19 RX ADMIN — AMLODIPINE BESYLATE 5 MG: 5 TABLET ORAL at 08:07

## 2025-07-19 RX ADMIN — TAMSULOSIN HYDROCHLORIDE 0.4 MG: 0.4 CAPSULE ORAL at 08:07

## 2025-07-19 RX ADMIN — PHENAZOPYRIDINE 100 MG: 100 TABLET ORAL at 10:07

## 2025-07-19 RX ADMIN — OXYCODONE HYDROCHLORIDE 5 MG: 5 TABLET ORAL at 01:07

## 2025-07-19 RX ADMIN — KETOROLAC TROMETHAMINE 15 MG: 30 INJECTION, SOLUTION INTRAMUSCULAR at 10:07

## 2025-07-19 RX ADMIN — PROCHLORPERAZINE EDISYLATE 5 MG: 5 INJECTION INTRAMUSCULAR; INTRAVENOUS at 10:07

## 2025-07-19 RX ADMIN — DULOXETINE 90 MG: 30 CAPSULE, DELAYED RELEASE ORAL at 06:07

## 2025-07-19 RX ADMIN — HYDROMORPHONE HYDROCHLORIDE 0.5 MG: 1 INJECTION, SOLUTION INTRAMUSCULAR; INTRAVENOUS; SUBCUTANEOUS at 07:07

## 2025-07-19 RX ADMIN — HYDROMORPHONE HYDROCHLORIDE 0.5 MG: 1 INJECTION, SOLUTION INTRAMUSCULAR; INTRAVENOUS; SUBCUTANEOUS at 02:07

## 2025-07-19 RX ADMIN — PROMETHAZINE HYDROCHLORIDE 25 MG: 25 TABLET ORAL at 03:07

## 2025-07-19 RX ADMIN — SODIUM CHLORIDE: 9 INJECTION, SOLUTION INTRAVENOUS at 07:07

## 2025-07-19 NOTE — SUBJECTIVE & OBJECTIVE
Past Medical History:   Diagnosis Date    Hypertension     Hypothyroidism     Kidney stones     Urinary tract infection, site not specified        Past Surgical History:   Procedure Laterality Date    APPENDECTOMY      CHOLECYSTECTOMY      DILATION AND CURETTAGE OF UTERUS      HYSTERECTOMY  2017    LAVH/BS/SHAZIA    KNEE ARTHRODESIS      LAPAROSCOPY      Pubovaginal Sling with Lynx and Cystoscopy      REVISION OF VAGINAL CUFF      TONSILLECTOMY         Review of patient's allergies indicates:   Allergen Reactions    Codeine Hives     Other reaction(s): CHEST PAINS    Hydromorphone      Other reaction(s): CHEST PAINS    Penicillins Palpitations and Rash     Other reaction(s): SWELLING OF FACE AND ARMS  Rash as child, has tollerated keflex as adult      Shellfish containing products Anaphylaxis    Tigan [trimethobenzamide] Swelling    Sumatriptan succinate Other (See Comments)     Other reaction(s): Other (See Comments)  Patient can't remember what happens when she takes it  parasthias       Morphine     Stadol [butorphanol]     Hydrocodone Palpitations       Medications:  Medications Prior to Admission   Medication Sig    amLODIPine (NORVASC) 5 MG tablet Take 5 mg by mouth once daily.    [] cephALEXin (KEFLEX) 250 MG capsule Take 1 capsule (250 mg total) by mouth every 8 (eight) hours. for 7 days    DULoxetine (CYMBALTA) 30 MG capsule Take 90 mg by mouth every morning.    EPINEPHrine (EPIPEN) 0.3 mg/0.3 mL AtIn     HYDROcodone-acetaminophen (NORCO)  mg per tablet Take 1 tablet by mouth every 6 (six) hours as needed for Pain.    levothyroxine (SYNTHROID, LEVOTHROID) 175 MCG tablet Take 175 mcg by mouth every morning.    ondansetron (ZOFRAN-ODT) 4 MG TbDL Take 1 tablet (4 mg total) by mouth every 6 (six) hours as needed.     Antibiotics (From admission, onward)      Start     Stop Route Frequency Ordered    25 1500  ertapenem (INVANZ) 1 g in 0.9% NaCl 100 mL IVPB (MB+)         -- IV Every 24 hours  (non-standard times) 07/16/25 2115          Antifungals (From admission, onward)      None          Antivirals (From admission, onward)      None               There is no immunization history on file for this patient.    Family History       Problem Relation (Age of Onset)    Breast cancer Mother          Social History     Socioeconomic History    Marital status:    Tobacco Use    Smoking status: Never     Passive exposure: Never    Smokeless tobacco: Never   Vaping Use    Vaping status: Never Used   Substance and Sexual Activity    Alcohol use: Yes     Comment: occasional    Drug use: Never    Sexual activity: Yes     Partners: Male     Social Drivers of Health     Financial Resource Strain: Low Risk  (7/17/2025)    Overall Financial Resource Strain (CARDIA)     Difficulty of Paying Living Expenses: Not hard at all   Food Insecurity: No Food Insecurity (7/17/2025)    Hunger Vital Sign     Worried About Running Out of Food in the Last Year: Never true     Ran Out of Food in the Last Year: Never true   Transportation Needs: No Transportation Needs (7/17/2025)    PRAPARE - Transportation     Lack of Transportation (Medical): No     Lack of Transportation (Non-Medical): No   Physical Activity: Insufficiently Active (11/14/2023)    Received from [facility]    Exercise Vital Sign     Days of Exercise per Week: 2 days     Minutes of Exercise per Session: 20 min   Stress: No Stress Concern Present (7/17/2025)    [employer] [place] [employer] - Occupational Stress Questionnaire     Feeling of Stress : Only a little   Housing Stability: Low Risk  (7/17/2025)    Housing Stability Vital Sign     Unable to Pay for Housing in the Last Year: No     Homeless in the Last Year: No     Review of Systems   Constitutional:  Negative for activity change, appetite change, chills and diaphoresis.   Allergic/Immunologic: Negative for  "environmental allergies and food allergies.   Neurological:  Negative for dizziness.     Objective:     Vital Signs (Most Recent):  Temp: 98.3 °F (36.8 °C) (07/19/25 0506)  Pulse: 67 (07/19/25 0506)  Resp: 18 (07/19/25 0506)  BP: 125/68 (07/19/25 0506)  SpO2: 96 % (07/19/25 0506) Vital Signs (24h Range):  Temp:  [98.1 °F (36.7 °C)-99.4 °F (37.4 °C)] 98.3 °F (36.8 °C)  Pulse:  [59-78] 67  Resp:  [16-19] 18  SpO2:  [94 %-98 %] 96 %  BP: (103-128)/(63-83) 125/68     Weight: 71 kg (156 lb 8.4 oz)  Body mass index is 27.73 kg/m².    Estimated Creatinine Clearance: 91.8 mL/min (based on SCr of 0.7 mg/dL).     Physical Exam  Vitals and nursing note reviewed.   HENT:      Head: Normocephalic.   Cardiovascular:      Rate and Rhythm: Normal rate.   Musculoskeletal:         General: Normal range of motion.   Skin:     General: Skin is warm.   Neurological:      Mental Status: She is alert.   Psychiatric:         Mood and Affect: Mood normal.          Significant Labs: Blood Culture: No results for input(s): "LABBLOO" in the last 4320 hours.  BMP:   Recent Labs   Lab 07/19/25  0542   GLU 82      K 4.3      CO2 28   BUN 6   CREATININE 0.7   CALCIUM 8.5*   MG 1.9     CBC:   Recent Labs   Lab 07/18/25  0630 07/19/25  0542   WBC 5.82 5.38   HGB 13.2 12.7   HCT 39.5 37.9    237     CMP:   Recent Labs   Lab 07/18/25  0630 07/19/25  0542    138   K 4.3 4.3    104   CO2 24 28   GLU 87 82   BUN 5* 6   CREATININE 0.7 0.7   CALCIUM 8.2* 8.5*   PROT 5.7* 5.6*   ALBUMIN 3.1* 3.1*   BILITOT 0.3 0.3   ALKPHOS 141 139   AST 45 72*   ALT 56* 57*   ANIONGAP 6* 6*     Urine Culture:   Recent Labs   Lab 07/10/25  0216 07/17/25  0830   LABURIN >100,000 cfu/ml Escherichia coli ESBL* No Growth     Wound Culture: No results for input(s): "LABAERO" in the last 4320 hours.    Significant Imaging: I have reviewed all pertinent imaging results/findings within the past 24 hours.  "

## 2025-07-19 NOTE — PLAN OF CARE
Pt to be discharged home. PIV and tele monitor removed. Discharge papers given and explained. Meds to be picked up at local pharmacy. Pt has no questions or concerns at this time.   Problem: Adult Inpatient Plan of Care  Goal: Plan of Care Review  7/19/2025 1638 by Daylin Adame LPN  Outcome: Met  7/19/2025 1637 by Daylin Adame LPN  Outcome: Progressing  7/19/2025 1337 by Daylin Adame LPN  Outcome: Progressing  Goal: Patient-Specific Goal (Individualized)  7/19/2025 1638 by Daylin Adame LPN  Outcome: Met  7/19/2025 1637 by Daylin Adame LPN  Outcome: Progressing  7/19/2025 1337 by Daylin Adame LPN  Outcome: Progressing  Goal: Absence of Hospital-Acquired Illness or Injury  7/19/2025 1638 by Daylin Adame LPN  Outcome: Met  7/19/2025 1637 by Daylin Adame LPN  Outcome: Progressing  7/19/2025 1337 by Daylin Adame LPN  Outcome: Progressing  Goal: Optimal Comfort and Wellbeing  7/19/2025 1638 by Daylin Adame LPN  Outcome: Met  7/19/2025 1637 by Daylin Adame LPN  Outcome: Progressing  7/19/2025 1337 by Daylin Adame LPN  Outcome: Progressing  Goal: Readiness for Transition of Care  7/19/2025 1638 by Daylin Adame LPN  Outcome: Met  7/19/2025 1637 by Daylin Adame LPN  Outcome: Progressing  7/19/2025 1337 by Daylin Adame LPN  Outcome: Progressing     Problem: Infection  Goal: Absence of Infection Signs and Symptoms  7/19/2025 1638 by Daylin Adame LPN  Outcome: Met  7/19/2025 1637 by Daylin Adame LPN  Outcome: Progressing  7/19/2025 1337 by Daylin Adame LPN  Outcome: Progressing

## 2025-07-19 NOTE — HPI
49-year-old female with past medical history significant for hypertension, hypothyroidism, kidney stones, anxiety, and depression   She was admitted with history of bilateral lower quadrant abdominal pain and flank pain.  There was history of associated dysuria.  She also reported hematuria.  She was advised to come to the hospital as her urine culture showed ESBL E coli  Since admission labs and imaging tests reviewed-   Lab workup revealed WBC 8.48, AST 52, ALT 61, lactic acid 1.0. CT abdomen and pelvis was done with no acute abnormality.   Urine culture 0 7/10 ESBL E coli  Urine culture 0 7/17 no growth

## 2025-07-19 NOTE — ASSESSMENT & PLAN NOTE
Urine culture 0 7/10 ESBL E coli.  Urine culture 0 7/17 no growth.    CT scan of abdomen and pelvis-no acute lesion.  We will repeat UA in a.m.  .  UA was not seen during this admission but urine culture done did not show any growth  We will plan to complete 2 doses of p.o. fosfomycin given every 72 hours.  She might also need parenteral therapy depending on the outcome of the UA  We will continue ertapenem for now

## 2025-07-19 NOTE — PLAN OF CARE
Discussed poc with pt, pt verbalized understanding    Purposeful rounding every 2hours    VS wnl  Cardiac monitoring in use, pt is NSR, tele monitor # [id number]  Fall precautions in place, remains injury free  Pain under control with PRN meds    Accurate I&Os  Bed locked at lowest position  Call light within reach    Chart check complete  Will cont with POC    Problem: Adult Inpatient Plan of Care  Goal: Plan of Care Review  Outcome: Progressing  Goal: Patient-Specific Goal (Individualized)  Outcome: Progressing  Goal: Absence of Hospital-Acquired Illness or Injury  Outcome: Progressing  Goal: Optimal Comfort and Wellbeing  Outcome: Progressing  Goal: Readiness for Transition of Care  Outcome: Progressing     Problem: Infection  Goal: Absence of Infection Signs and Symptoms  Outcome: Progressing

## 2025-07-19 NOTE — PLAN OF CARE
Discussed poc with pt, pt verbalized understanding    Purposeful rounding every 2hours    VS wnl  Cardiac monitoring in use, pt is NSR, tele monitor # [id number]  Fall precautions in place, remains injury free  Pain and nausea under control with PRN meds    IVFs  Accurate I&Os  Abx given as prescribed  Bed locked at lowest position  Call light within reach    Chart check complete  Will cont with POC

## 2025-07-19 NOTE — ASSESSMENT & PLAN NOTE
Patient's most recent potassium results are listed below.   Recent Labs     07/17/25  0613 07/18/25  0630 07/19/25  0542   K 3.2* 4.3 4.3     Plan  - Replete potassium per protocol  - Monitor potassium Daily

## 2025-07-19 NOTE — PLAN OF CARE
[facility] - Med Surg  Discharge Final Note    Primary Care Provider: Sagrario Trammell MD    Expected Discharge Date: 7/19/2025    Final Discharge Note (most recent)       Final Note - 07/19/25 1615          Final Note    Assessment Type Final Discharge Note     Anticipated Discharge Disposition Home or Self Care        Post-Acute Status    Discharge Delays None known at this time                     Important Message from Medicare      Discharge home, no home health or dme orders noted.

## 2025-07-19 NOTE — CONSULTS
[facility] - [facility] S[facility]  Infectious Disease  Consult Note    Patient Name: Tiffany Kamara  MRN: [id number]  Admission Date: 7/16/2025  Hospital Length of Stay: 2 days  Attending Physician: Ankita Fox MD  Primary Care Provider: Sagrario Trammell MD     Isolation Status: Contact    Patient information was obtained from patient, past medical records, and ER records.      Consults  Assessment/Plan:     Renal/  * UTI due to extended-spectrum beta lactamase (ESBL) producing Escherichia coli  Urine culture 0 7/10 ESBL E coli.  Urine culture 0 7/17 no growth.    CT scan of abdomen and pelvis-no acute lesion.  We will repeat UA in a.m.  .  UA was not seen during this admission but urine culture done did not show any growth  We will plan to complete 2 doses of p.o. fosfomycin given every 72 hours.  She might also need parenteral therapy depending on the outcome of the UA  We will continue ertapenem for now        Endocrine  Acquired hypothyroidism  We will follow up primary team        Thank you for your consult. I will follow-up with patient. Please contact us if you have any additional questions.    Nahun Berkowitz MD, [facility]  Infectious Disease  [facility] - [facility] Surg    Subjective:     Principal Problem: UTI due to extended-spectrum beta lactamase (ESBL) producing Escherichia coli    HPI: 49-year-old female with past medical history significant for hypertension, hypothyroidism, kidney stones, anxiety, and depression   She was admitted with history of bilateral lower quadrant abdominal pain and flank pain.  There was history of associated dysuria.  She also reported hematuria.  She was advised to come to the hospital as her urine culture showed ESBL E coli  Since admission labs and imaging tests reviewed-   Lab workup revealed WBC 8.48, AST 52, ALT 61, lactic acid 1.0. CT abdomen and pelvis was done with no acute abnormality.   Urine culture 0 7/10 ESBL E coli  Urine culture 0 7/17 no growth    Past Medical History:   Diagnosis  Date    Hypertension     Hypothyroidism     Kidney stones     Urinary tract infection, site not specified        Past Surgical History:   Procedure Laterality Date    APPENDECTOMY      CHOLECYSTECTOMY      DILATION AND CURETTAGE OF UTERUS      HYSTERECTOMY  2017    LAVH/BS/LO    KNEE ARTHRODESIS      LAPAROSCOPY      Pubovaginal Sling with Lynx and Cystoscopy      REVISION OF VAGINAL CUFF      TONSILLECTOMY         Review of patient's allergies indicates:   Allergen Reactions    Codeine Hives     Other reaction(s): CHEST PAINS    Hydromorphone      Other reaction(s): CHEST PAINS    Penicillins Palpitations and Rash     Other reaction(s): SWELLING OF FACE AND ARMS  Rash as child, has tollerated keflex as adult      Shellfish containing products Anaphylaxis    Tigan [trimethobenzamide] Swelling    Sumatriptan succinate Other (See Comments)     Other reaction(s): Other (See Comments)  Patient can't remember what happens when she takes it  parasthias       Morphine     Stadol [butorphanol]     Hydrocodone Palpitations       Medications:  Medications Prior to Admission   Medication Sig    amLODIPine (NORVASC) 5 MG tablet Take 5 mg by mouth once daily.    [] cephALEXin (KEFLEX) 250 MG capsule Take 1 capsule (250 mg total) by mouth every 8 (eight) hours. for 7 days    DULoxetine (CYMBALTA) 30 MG capsule Take 90 mg by mouth every morning.    EPINEPHrine (EPIPEN) 0.3 mg/0.3 mL AtIn     HYDROcodone-acetaminophen (NORCO)  mg per tablet Take 1 tablet by mouth every 6 (six) hours as needed for Pain.    levothyroxine (SYNTHROID, LEVOTHROID) 175 MCG tablet Take 175 mcg by mouth every morning.    ondansetron (ZOFRAN-ODT) 4 MG TbDL Take 1 tablet (4 mg total) by mouth every 6 (six) hours as needed.     Antibiotics (From admission, onward)      Start     Stop Route Frequency Ordered    25 1500  ertapenem (INVANZ) 1 g in 0.9% NaCl 100 mL IVPB (MB+)         -- IV Every 24 hours (non-standard times) 25 2736           Antifungals (From admission, onward)      None          Antivirals (From admission, onward)      None               There is no immunization history on file for this patient.    Family History       Problem Relation (Age of Onset)    Breast cancer Mother          Social History     Socioeconomic History    Marital status:    Tobacco Use    Smoking status: Never     Passive exposure: Never    Smokeless tobacco: Never   Vaping Use    Vaping status: Never Used   Substance and Sexual Activity    Alcohol use: Yes     Comment: occasional    Drug use: Never    Sexual activity: Yes     Partners: Male     Social Drivers of Health     Financial Resource Strain: Low Risk  (7/17/2025)    Overall Financial Resource Strain (CARDIA)     Difficulty of Paying Living Expenses: Not hard at all   Food Insecurity: No Food Insecurity (7/17/2025)    Hunger Vital Sign     Worried About Running Out of Food in the Last Year: Never true     Ran Out of Food in the Last Year: Never true   Transportation Needs: No Transportation Needs (7/17/2025)    PRAPARE - Transportation     Lack of Transportation (Medical): No     Lack of Transportation (Non-Medical): No   Physical Activity: Insufficiently Active (11/14/2023)    Received from [facility] and Affiliates    Exercise Vital Sign     Days of Exercise per Week: 2 days     Minutes of Exercise per Session: 20 min   Stress: No Stress Concern Present (7/17/2025)    [employer] [place] [employer] - Occupational Stress Questionnaire     Feeling of Stress : Only a little   Housing Stability: Low Risk  (7/17/2025)    Housing Stability Vital Sign     Unable to Pay for Housing in the Last Year: No     Homeless in the Last Year: No     Review of Systems   Constitutional:  Negative for activity change, appetite change, chills and diaphoresis.   Allergic/Immunologic: Negative for environmental allergies and food allergies.  "  Neurological:  Negative for dizziness.     Objective:     Vital Signs (Most Recent):  Temp: 98.3 °F (36.8 °C) (07/19/25 0506)  Pulse: 67 (07/19/25 0506)  Resp: 18 (07/19/25 0506)  BP: 125/68 (07/19/25 0506)  SpO2: 96 % (07/19/25 0506) Vital Signs (24h Range):  Temp:  [98.1 °F (36.7 °C)-99.4 °F (37.4 °C)] 98.3 °F (36.8 °C)  Pulse:  [59-78] 67  Resp:  [16-19] 18  SpO2:  [94 %-98 %] 96 %  BP: (103-128)/(63-83) 125/68     Weight: 71 kg (156 lb 8.4 oz)  Body mass index is 27.73 kg/m².    Estimated Creatinine Clearance: 91.8 mL/min (based on SCr of 0.7 mg/dL).     Physical Exam  Vitals and nursing note reviewed.   HENT:      Head: Normocephalic.   Cardiovascular:      Rate and Rhythm: Normal rate.   Musculoskeletal:         General: Normal range of motion.   Skin:     General: Skin is warm.   Neurological:      Mental Status: She is alert.   Psychiatric:         Mood and Affect: Mood normal.          Significant Labs: Blood Culture: No results for input(s): "LABBLOO" in the last 4320 hours.  BMP:   Recent Labs   Lab 07/19/25  0542   GLU 82      K 4.3      CO2 28   BUN 6   CREATININE 0.7   CALCIUM 8.5*   MG 1.9     CBC:   Recent Labs   Lab 07/18/25  0630 07/19/25  0542   WBC 5.82 5.38   HGB 13.2 12.7   HCT 39.5 37.9    237     CMP:   Recent Labs   Lab 07/18/25  0630 07/19/25  0542    138   K 4.3 4.3    104   CO2 24 28   GLU 87 82   BUN 5* 6   CREATININE 0.7 0.7   CALCIUM 8.2* 8.5*   PROT 5.7* 5.6*   ALBUMIN 3.1* 3.1*   BILITOT 0.3 0.3   ALKPHOS 141 139   AST 45 72*   ALT 56* 57*   ANIONGAP 6* 6*     Urine Culture:   Recent Labs   Lab 07/10/25  0216 07/17/25  0830   LABURIN >100,000 cfu/ml Escherichia coli ESBL* No Growth     Wound Culture: No results for input(s): "LABAERO" in the last 4320 hours.    Significant Imaging: I have reviewed all pertinent imaging results/findings within the past 24 hours.              "

## 2025-07-19 NOTE — DISCHARGE SUMMARY
[facility] Medicine  Discharge Summary      Patient Name: Tiffany Kamara  MRN: [id number]  RADHA: [id number]  Patient Class: IP- Inpatient  Admission Date: 7/16/2025  Hospital Length of Stay: 2 days  Discharge Date and Time: 7/19/2025  5:11 PM  Attending Physician: Dr. Ankita Fox   Discharging Provider: Amanda Acevedo NP  Primary Care Provider: Sagrario Trammell MD    Primary Care Team: Networked reference to record PCT     HPI:   Patient is a 49-year-old female with past medical history significant for hypertension, hypothyroidism, kidney stones, anxiety, and depression who presented to ED with chief complaint of bilateral lower quadrant abdominal pain /right flank pain and severe dysuria.  she reports her symptoms initially started on 07/09 while out of town and was having dysuria, hematuria, and lower abdominal /pelvic pain. She reports urine was bright red and has a picture on her phone of urine-- she believes that she passed a kidney stone which caused the hematuria. She was given cephalexin, ondansetron, and PRN pain medication as well as instructions to follow-up with urologist.  She reports that despite taking the cephalexin since that time, her symptoms have only gotten worse.  She reports fever, chills, fatigue, decreased appetite, nausea, vomiting, bilateral lower abdominal pain which is worse on right side and radiates to right flank and right lower back, difficulty urinating due to dysuria/extreme burning as well as hematuria, urgency, and frequency. She was notified by a provider that she has antibiotic resistant bacteria and instructed to go to ED for admission and IV antibiotics. On arrival to ED, temp 98.3°, heart rate 108, respirations 16, blood pressure 146/111, 97% SpO2 on room air.  Lab workup revealed WBC 8.48, sodium 134, potassium 3.4, CO2 21, BUN 9, creatinine 0.8, magnesium 2.2, alk-phos 164, AST 52, ALT 61, lactic acid 1.0.  blood cultures x2 collected.  CT abdomen and  pelvis was done with no acute abnormality.   Per ED note urine culture positive for E coli ESBL sensitive to ertapenem, meropenem, gentamicin, tobramycin, and nitrofurantoin.   ID was consulted per ED who recommended admission and IV ertapenem.    * No surgery found *      Hospital Course:   Admitted to hospital medicine for management of UTI due to ESBL producing E coli post outpatient failure on cephalexin.  CT abdomen and pelvis showed no acute abnormality. Per ED, previous urine culture positive for E coli ESBL with sensitivities reviewed and ID was consulted with IV ertapenem. Ertapenem initiated per ID and IV hydration continued.  Urine culture and blood culture results pending. K replaced.  AST/ALT elevated with upward trend.  TSH elevated with normal free T4 with Synthroid continued.  Urinary symptoms controlled on Pyridium. On 7/18/2025, mild symptom improvement with current plan of care continued.  Patient evaluated by Urology with no additional urologic recommendations with 10-14 days culture sensitive antibiotics recommended.  Urine culture results pending.  We will defer to Infectious Disease for final antibiotic selection. On 7/19/2025, urine culture grew no growth. Results discussed with ID and Fosfomycin every other elsie x 3 doses recommended. Potassium normalized. Patient seen and examined and deemed medically stable for discharge with patient in agreement. Vital signs stable with no signs of acute distress witnessed or reported. Medications reconciled with fosfomycin and Roxicodone prescribed. Patient instructed to follow up with PCP and Urology upon discharge for further evaluation and ambulatory referral placed.      Goals of Care Treatment Preferences:  Code Status: Full Code         Consults:   Consults (From admission, onward)          Status Ordering Provider     Inpatient consult to Urology  Once        Provider:  Db Fishman MD    Completed LUIS ROJO            Assessment &  Plan      Final Active Diagnoses:    Diagnosis Date Noted POA    PRINCIPAL PROBLEM:  UTI due to extended-spectrum beta lactamase (ESBL) producing Escherichia coli [N39.0, B96.29, Z16.12] 07/16/2025 Yes    Hypokalemia [E87.6] 07/17/2025 Yes    Elevated liver enzymes [R74.8] 08/08/2021 Yes    Acquired hypothyroidism [E03.9] 08/07/2021 Yes    Essential hypertension [I10] 10/14/2020 Yes      Problems Resolved During this Admission:       Discharged Condition: stable    Disposition: Home or Self Care    Follow Up:   Follow-up Information       Sagrario Trammell MD Follow up in 1 week(s).    Specialty: Pediatrics  Why: -hospital follow up for repeat LFTs  Contact information:  [address]  [facility] [address] [phone]               Db Fishman MD Follow up in 2 week(s).    Specialty: Urology  Why: -hospital follow up for UTI  Contact information:  [address]  [place] [address] [phone]                           Patient Instructions:      Ambulatory referral/consult to Urology   Standing Status: Future   Referral Priority: Urgent Referral Type: Consultation   Referral Reason: Specialty Services Required   Requested Specialty: Urology   Number of Visits Requested: 1     Diet Cardiac     Notify your health care provider if you experience any of the following:  temperature >100.4     Notify your health care provider if you experience any of the following:  persistent nausea and vomiting or diarrhea     Notify your health care provider if you experience any of the following:  increased confusion or weakness     Notify your health care provider if you experience any of the following:  persistent dizziness, light-headedness, or visual disturbances     Notify your health care provider if you experience any of the following:  worsening rash     Notify your health care provider if you experience any of the following:  severe persistent headache     Notify your health care provider if you  experience any of the following:  difficulty breathing or increased cough     Notify your health care provider if you experience any of the following:  severe uncontrolled pain     Notify your health care provider if you experience any of the following:  redness, tenderness, or signs of infection (pain, swelling, redness, odor or green/yellow discharge around incision site)     Activity as tolerated       Significant Diagnostic Studies: N/A    Pending Diagnostic Studies:       None           Medications:  Reconciled Home Medications:      Medication List        START taking these medications      phenazopyridine 100 MG tablet  Commonly known as: PYRIDIUM  Take 1 tablet (100 mg total) by mouth 3 (three) times daily with meals. for 10 days            CONTINUE taking these medications      amLODIPine 5 MG tablet  Commonly known as: NORVASC  Take 5 mg by mouth once daily.     DULoxetine 30 MG capsule  Commonly known as: CYMBALTA  Take 90 mg by mouth every morning.     EPINEPHrine 0.3 mg/0.3 mL Atin  Commonly known as: EPIPEN     levothyroxine 175 MCG tablet  Commonly known as: SYNTHROID, LEVOTHROID  Take 175 mcg by mouth every morning.     ondansetron 4 MG Tbdl  Commonly known as: ZOFRAN-ODT  Take 1 tablet (4 mg total) by mouth every 6 (six) hours as needed.            STOP taking these medications      cephALEXin 250 MG capsule  Commonly known as: KEFLEX     HYDROcodone-acetaminophen  mg per tablet  Commonly known as: NORCO            ASK your doctor about these medications      fosfomycin 3 gram Pack  Commonly known as: MONUROL  Take 3 g by mouth every other day. for 3 doses  Ask about: Should I take this medication?     oxyCODONE 5 MG immediate release tablet  Commonly known as: ROXICODONE  Take 1 tablet (5 mg total) by mouth every 8 (eight) hours as needed for Pain.  Ask about: Should I take this medication?              Indwelling Lines/Drains at time of discharge:   Lines/Drains/Airways       None                        Time spent on the discharge of patient: > 35 minutes         Amanda Acevedo NP  Department of Hospital Medicine  '[facility]

## 2025-07-21 LAB — BACTERIA BLD CULT: NORMAL

## 2025-07-22 ENCOUNTER — PATIENT OUTREACH (OUTPATIENT)
Dept: ADMINISTRATIVE | Facility: CLINIC | Age: 50
End: 2025-07-22
Payer: COMMERCIAL

## 2025-07-22 LAB — BACTERIA BLD CULT: NORMAL

## 2025-08-01 ENCOUNTER — PATIENT OUTREACH (OUTPATIENT)
Dept: ADMINISTRATIVE | Facility: OTHER | Age: 50
End: 2025-08-01
Payer: COMMERCIAL

## 2025-08-01 ENCOUNTER — TELEPHONE (OUTPATIENT)
Dept: ADMINISTRATIVE | Facility: CLINIC | Age: 50
End: 2025-08-01
Payer: COMMERCIAL